# Patient Record
Sex: MALE | Race: WHITE | Employment: OTHER | URBAN - METROPOLITAN AREA
[De-identification: names, ages, dates, MRNs, and addresses within clinical notes are randomized per-mention and may not be internally consistent; named-entity substitution may affect disease eponyms.]

---

## 2017-10-26 ENCOUNTER — DOCTOR'S OFFICE (OUTPATIENT)
Dept: URBAN - METROPOLITAN AREA CLINIC 137 | Facility: CLINIC | Age: 72
Setting detail: OPHTHALMOLOGY
End: 2017-10-26
Payer: COMMERCIAL

## 2017-10-26 ENCOUNTER — RX ONLY (RX ONLY)
Age: 72
End: 2017-10-26

## 2017-10-26 DIAGNOSIS — H52.4: ICD-10-CM

## 2017-10-26 PROCEDURE — 92014 COMPRE OPH EXAM EST PT 1/>: CPT | Performed by: OPHTHALMOLOGY

## 2017-10-26 ASSESSMENT — TEAR BREAK UP TIME (TBUT)
OS_TBUT: 1+
OD_TBUT: 1+

## 2017-10-26 ASSESSMENT — AXIALLENGTH_DERIVED
OS_AL: 22.9017
OD_AL: 22.5429
OS_AL: 22.9017
OD_AL: 22.6322

## 2017-10-26 ASSESSMENT — REFRACTION_CURRENTRX
OS_OVR_VA: 20/
OS_ADD: +2.50
OS_OVR_VA: 20/
OD_OVR_VA: 20/
OD_AXIS: 008
OS_OVR_VA: 20/
OS_VPRISM_DIRECTION: BF
OS_SPHERE: PLANO
OD_OVR_VA: 20/
OS_CYLINDER: +1.50
OD_VPRISM_DIRECTION: BF
OD_ADD: +2.50
OS_AXIS: 007
OD_CYLINDER: +0.75
OD_OVR_VA: 20/
OD_SPHERE: +0.75

## 2017-10-26 ASSESSMENT — REFRACTION_MANIFEST
OU_VA: 20/
OS_VA1: 20/20
OD_VA2: 20/
OS_AXIS: 180
OS_CYLINDER: +1.50
OD_AXIS: 005
OD_VA2: 20/
OS_VA3: 20/
OS_VA1: 20/
OS_VA3: 20/
OD_VA1: 20/
OD_VA3: 20/
OS_ADD: +2.50
OD_CYLINDER: +0.75
OD_SPHERE: +1.25
OS_VA2: 20/
OD_VA3: 20/
OU_VA: 20/
OD_VA1: 20/20
OS_VA2: 20/
OD_ADD: +2.50
OS_SPHERE: +0.25

## 2017-10-26 ASSESSMENT — REFRACTION_OUTSIDERX
OS_AXIS: 175
OS_ADD: +2.50
OD_ADD: +2.50
OD_VA3: 20/
OS_VA1: 20/20
OS_SPHERE: +0.50
OD_VA2: 20/J-1
OD_VA1: 20/20
OS_VA2: 20/J-1
OS_CYLINDER: +0.75
OU_VA: 20/
OD_AXIS: 10
OD_CYLINDER: +0.50
OD_SPHERE: +1.50
OS_VA3: 20/

## 2017-10-26 ASSESSMENT — CONFRONTATIONAL VISUAL FIELD TEST (CVF)
OS_FINDINGS: FULL
OD_FINDINGS: FULL

## 2017-10-26 ASSESSMENT — KERATOMETRY
OD_K1POWER_DIOPTERS: 44.25
OS_K2POWER_DIOPTERS: 44.75
OD_K2POWER_DIOPTERS: 44.75
OD_AXISANGLE_DEGREES: 174
OS_K1POWER_DIOPTERS: 44.00
OS_AXISANGLE_DEGREES: 173

## 2017-10-26 ASSESSMENT — REFRACTION_AUTOREFRACTION
OS_CYLINDER: +1.50
OD_CYLINDER: +0.75
OD_AXIS: 15
OS_SPHERE: +0.25
OS_AXIS: 172
OD_SPHERE: +1.50

## 2017-10-26 ASSESSMENT — VISUAL ACUITY
OD_BCVA: 20/20-1
OS_BCVA: 20/25-2

## 2017-10-26 ASSESSMENT — SPHEQUIV_DERIVED
OD_SPHEQUIV: 1.875
OD_SPHEQUIV: 1.625
OS_SPHEQUIV: 1
OS_SPHEQUIV: 1

## 2017-11-29 ENCOUNTER — OPTICAL OFFICE (OUTPATIENT)
Dept: URBAN - METROPOLITAN AREA CLINIC 146 | Facility: CLINIC | Age: 72
Setting detail: OPHTHALMOLOGY
End: 2017-11-29
Payer: COMMERCIAL

## 2017-11-29 DIAGNOSIS — H52.223: ICD-10-CM

## 2017-11-29 PROCEDURE — V2203 LENS SPHCYL BIFOCAL 4.00D/.1: HCPCS | Performed by: OPHTHALMOLOGY

## 2017-11-29 PROCEDURE — V2020 VISION SVCS FRAMES PURCHASES: HCPCS | Performed by: OPHTHALMOLOGY

## 2018-07-06 ENCOUNTER — OFFICE VISIT (OUTPATIENT)
Dept: FAMILY MEDICINE CLINIC | Facility: CLINIC | Age: 73
End: 2018-07-06
Payer: COMMERCIAL

## 2018-07-06 VITALS
SYSTOLIC BLOOD PRESSURE: 160 MMHG | DIASTOLIC BLOOD PRESSURE: 100 MMHG | WEIGHT: 203.9 LBS | HEART RATE: 61 BPM | HEIGHT: 71 IN | BODY MASS INDEX: 28.55 KG/M2 | TEMPERATURE: 98.6 F | OXYGEN SATURATION: 97 %

## 2018-07-06 DIAGNOSIS — M54.5 LOW BACK PAIN, UNSPECIFIED BACK PAIN LATERALITY, UNSPECIFIED CHRONICITY, WITH SCIATICA PRESENCE UNSPECIFIED: Primary | ICD-10-CM

## 2018-07-06 PROBLEM — M54.50 LOW BACK PAIN: Status: ACTIVE | Noted: 2018-07-06

## 2018-07-06 PROCEDURE — 3725F SCREEN DEPRESSION PERFORMED: CPT | Performed by: FAMILY MEDICINE

## 2018-07-06 PROCEDURE — 99213 OFFICE O/P EST LOW 20 MIN: CPT | Performed by: FAMILY MEDICINE

## 2018-07-06 RX ORDER — ESOMEPRAZOLE MAGNESIUM 40 MG/1
40 CAPSULE, DELAYED RELEASE ORAL
COMMUNITY
End: 2018-12-18 | Stop reason: ALTCHOICE

## 2018-07-06 RX ORDER — IBUPROFEN 600 MG/1
600 TABLET ORAL EVERY 8 HOURS SCHEDULED
Qty: 21 TABLET | Refills: 0 | Status: SHIPPED | OUTPATIENT
Start: 2018-07-06 | End: 2019-01-22 | Stop reason: HOSPADM

## 2018-07-06 NOTE — PATIENT INSTRUCTIONS
Continue the heat  Gentle stretching of the spine in all planes 3 times a day for about 45 min  Take the anti-inflammatories 3 times a day for study 7 day treatment plan

## 2018-07-06 NOTE — PROGRESS NOTES
Assessment/Plan:    No problem-specific Assessment & Plan notes found for this encounter  Diagnoses and all orders for this visit:    Low back pain, unspecified back pain laterality, unspecified chronicity, with sciatica presence unspecified  Comments:  2 -3 wks ; no fall / trauma ; Orders:  -     ibuprofen (MOTRIN) 600 mg tablet; Take 1 tablet (600 mg total) by mouth every 8 (eight) hours    Other orders  -     esomeprazole (NexIUM) 40 MG capsule; Take 40 mg by mouth every morning before breakfast          Subjective:      Patient ID: Donta Solis is a 68 y o  male  Patient describes 2-3 weeks of low back pain nonradicular no history of fall or trauma and no prior history of back issues  He has tried of sporadic ibuprofen to some mild relief        Back Pain     Hip Pain          The following portions of the patient's history were reviewed and updated as appropriate: allergies, current medications, past family history, past medical history, past social history, past surgical history and problem list     Review of Systems      Objective:  Vitals:    07/06/18 1604   BP: 160/100   BP Location: Left arm   Patient Position: Sitting   Cuff Size: Adult   Pulse: 61   Temp: 98 6 °F (37 °C)   SpO2: 97%   Weight: 92 5 kg (203 lb 14 4 oz)   Height: 5' 11" (1 803 m)      Physical Exam   Musculoskeletal: He exhibits tenderness    << ROM spine due to pain

## 2018-08-15 ENCOUNTER — TELEPHONE (OUTPATIENT)
Dept: FAMILY MEDICINE CLINIC | Facility: CLINIC | Age: 73
End: 2018-08-15

## 2018-08-15 NOTE — TELEPHONE ENCOUNTER
Pt is looking for x ray results and will like to know if we received them he also wanted to know if he would need an mri he is scheduled for tomorrow morning thank you

## 2018-08-15 NOTE — TELEPHONE ENCOUNTER
Patient notified that we did not received the result   Patient will be calling express care for the result

## 2018-08-16 ENCOUNTER — OFFICE VISIT (OUTPATIENT)
Dept: FAMILY MEDICINE CLINIC | Facility: CLINIC | Age: 73
End: 2018-08-16
Payer: COMMERCIAL

## 2018-08-16 VITALS
HEART RATE: 68 BPM | TEMPERATURE: 96.1 F | RESPIRATION RATE: 18 BRPM | WEIGHT: 205.6 LBS | HEIGHT: 71 IN | BODY MASS INDEX: 28.78 KG/M2 | OXYGEN SATURATION: 97 % | DIASTOLIC BLOOD PRESSURE: 98 MMHG | SYSTOLIC BLOOD PRESSURE: 178 MMHG

## 2018-08-16 DIAGNOSIS — M25.561 ACUTE PAIN OF RIGHT KNEE: Primary | ICD-10-CM

## 2018-08-16 PROCEDURE — 1036F TOBACCO NON-USER: CPT | Performed by: FAMILY MEDICINE

## 2018-08-16 PROCEDURE — 99213 OFFICE O/P EST LOW 20 MIN: CPT | Performed by: FAMILY MEDICINE

## 2018-08-16 PROCEDURE — 1160F RVW MEDS BY RX/DR IN RCRD: CPT | Performed by: FAMILY MEDICINE

## 2018-08-16 PROCEDURE — 3008F BODY MASS INDEX DOCD: CPT | Performed by: FAMILY MEDICINE

## 2018-08-16 PROCEDURE — 20610 DRAIN/INJ JOINT/BURSA W/O US: CPT | Performed by: FAMILY MEDICINE

## 2018-08-16 RX ORDER — LIDOCAINE HYDROCHLORIDE 10 MG/ML
2 INJECTION, SOLUTION INFILTRATION; PERINEURAL
Status: COMPLETED | OUTPATIENT
Start: 2018-08-16 | End: 2018-08-16

## 2018-08-16 RX ORDER — TRIAMCINOLONE ACETONIDE 40 MG/ML
40 INJECTION, SUSPENSION INTRA-ARTICULAR; INTRAMUSCULAR
Status: COMPLETED | OUTPATIENT
Start: 2018-08-16 | End: 2018-08-16

## 2018-08-16 RX ORDER — TRIAMCINOLONE ACETONIDE 40 MG/ML
20 INJECTION, SUSPENSION INTRA-ARTICULAR; INTRAMUSCULAR
Status: COMPLETED | OUTPATIENT
Start: 2018-08-16 | End: 2018-08-16

## 2018-08-16 RX ADMIN — TRIAMCINOLONE ACETONIDE 40 MG: 40 INJECTION, SUSPENSION INTRA-ARTICULAR; INTRAMUSCULAR at 07:57

## 2018-08-16 RX ADMIN — LIDOCAINE HYDROCHLORIDE 2 ML: 10 INJECTION, SOLUTION INFILTRATION; PERINEURAL at 07:57

## 2018-08-16 RX ADMIN — TRIAMCINOLONE ACETONIDE 20 MG: 40 INJECTION, SUSPENSION INTRA-ARTICULAR; INTRAMUSCULAR at 07:57

## 2018-08-16 NOTE — PATIENT INSTRUCTIONS
CALL ME IN 1 WEEK IF WERE NOT GETTING SOME IMPROVEMENT  IF THE KNEE DOES WELL FOR A TIME AND THEN RECURRED TURNS COME BACK AGAIN FOR A BOOSTER SHOT    IF THERE IS NO IMPROVEMENT IN ALL PLEASE CALL

## 2018-08-16 NOTE — PROGRESS NOTES
Assessment/Plan:    No problem-specific Assessment & Plan notes found for this encounter  There are no diagnoses linked to this encounter  Subjective:      Patient ID: Edgar Edge is a 68 y o  male  JACE DESCRIBES PAIN IN THE RIGHT KNEE FOR APPROXIMATELY 4-5 WEEKS  POSSIBLY INITIATED BY A LONG RUN OR A OF FALL THAT HE HAD WHICH POSSIBLY REAGGRAVATED  HE HAS NO HISTORY OF PRIOR KNEE ISSUES  IBUPROFEN HAS NOT BEEN VERY HELPFUL  HE HAD X-RAYS DONE AT AN URGENT CARE WHICH WERE REPORTEDLY NORMAL  The following portions of the patient's history were reviewed and updated as appropriate: allergies, current medications, past family history, past medical history, past social history, past surgical history and problem list     Review of Systems   Musculoskeletal: Positive for arthralgias  Objective: There were no vitals filed for this visit  Physical Exam   Musculoskeletal: He exhibits edema  He exhibits no tenderness     NO CREPITUS          Large joint arthrocentesis  Date/Time: 8/16/2018 7:57 AM  Consent given by: patient  Site marked: site marked (PREPPED USING STERILE TECHNIQUE)  Supporting Documentation  Indications: pain   Procedure Details  Location: knee - R knee  Needle size: 25 G  Approach: anterolateral  Medications administered: 40 mg triamcinolone acetonide 40 mg/mL; 20 mg triamcinolone acetonide 40 mg/mL; 2 mL lidocaine 1 %

## 2018-09-06 ENCOUNTER — OFFICE VISIT (OUTPATIENT)
Dept: FAMILY MEDICINE CLINIC | Facility: CLINIC | Age: 73
End: 2018-09-06
Payer: COMMERCIAL

## 2018-09-06 VITALS
RESPIRATION RATE: 18 BRPM | HEART RATE: 58 BPM | HEIGHT: 71 IN | WEIGHT: 197.2 LBS | SYSTOLIC BLOOD PRESSURE: 170 MMHG | DIASTOLIC BLOOD PRESSURE: 100 MMHG | BODY MASS INDEX: 27.61 KG/M2

## 2018-09-06 DIAGNOSIS — L98.9 SKIN LESION: Primary | ICD-10-CM

## 2018-09-06 PROBLEM — R03.0 ELEVATED BLOOD PRESSURE READING: Chronic | Status: ACTIVE | Noted: 2018-09-06

## 2018-09-06 PROCEDURE — 99213 OFFICE O/P EST LOW 20 MIN: CPT | Performed by: FAMILY MEDICINE

## 2018-09-06 PROCEDURE — 17110 DESTRUCTION B9 LES UP TO 14: CPT | Performed by: FAMILY MEDICINE

## 2018-09-06 NOTE — PATIENT INSTRUCTIONS
PROPER BLOOD PRESSURE MEASUREMENTS INCLUDE:     SIT IN A STRAIGHT BACK CHAIR WITH YOUR FEET ON THE FLOOR   SIT AT LEAST 5 MINUTES BEFORE CHECKING BP   USE A CUFF THAT IS ABOVE THE ELBOW AND AUTOMATIC   "OMRON" IS A GOOD BRAND : [facility]/ [facility] HAVE THEM    RECORD BP 3-4 TIMES PER WEEK AND BRING TO NEXT OFFICE VISIT    Target Blood Pressure :  145/90 or less     AVOID THESE HIGH SALT FOODS:    SNACKS THAT TASTE SALTY: PRETZELS/CHIPSPOPCORN  CANNED SOUPS AND VEGGIES  FROZEN FOODS/ MICROWAVEABLE FOODS/ CANNED FOODS    AVOID ADDING EXTRA SALT TO THE MEALS CURRENT [employer] TIPS ON EXERCISE:    IF YOU HAVE CONDITIONS THAT PREVENT AEROBIC EXERCISE:    WALK 30 MINUTES AT LEAST 5 DAYS PER WEEK; MAY BREAK IT UP INTO 10-  15 MINUTE SESSIONS   GET SOME RESISTANCE EXERCISES USING THE MAJOR MUSCLE GROUPS 2-3 TIMES PER WEEK     IF YOU ARE PHYSICALLY ABLE:    TRY TO GET 10,000 STEPS 3 TIMES PER WEEK  PLUS  GO "ONLINE" TO CHECK TARGET HEART RATE FOR YOUR AGE AND DO AEROBIC EXERCISES (JOG/STATIONARY BIKE/ELLIPTICAL) FOR 20-30 MINUTES 2-3 TIMES PER WEEK  Consider taking ibuprofen 600 mg 1-2 hours before physical activity that might cause musculoskeletal pain  For the knee gradually increase your duration and level of intensity for exercise over the next several weeks

## 2018-09-06 NOTE — PROGRESS NOTES
Assessment/Plan:    Low back pain  Gets worse with yard work        Diagnoses and all orders for this visit:    Skin lesion  Comments:  N2 6 mos ago and remians           Subjective:      Patient ID: Berkeley Saint is a 68 y o  male      HPI    The following portions of the patient's history were reviewed and updated as appropriate: allergies, current medications, past family history, past medical history, past social history, past surgical history and problem list     Review of Systems      Objective:  Vitals:    09/06/18 1141   BP: 170/100   BP Location: Left arm   Patient Position: Sitting   Cuff Size: Large   Pulse: 58   Resp: 18   Weight: 89 4 kg (197 lb 3 2 oz)   Height: 5' 11" (1 803 m)      Physical Exam      Irritated area under l eye : frozen today     n2 today   abit deeper

## 2018-09-25 ENCOUNTER — TELEPHONE (OUTPATIENT)
Dept: FAMILY MEDICINE CLINIC | Facility: CLINIC | Age: 73
End: 2018-09-25

## 2018-09-25 NOTE — TELEPHONE ENCOUNTER
Pt called in states that he was mowing his lawn and hurt his back  Pt did some back exercises, applied ice to his spine, and did some knee to chests  Pt does feel better today  Pt wants to ask Cathy Miller does he have to worry about mowing his lawn for future reference? Please advise thank you

## 2018-09-27 ENCOUNTER — TELEPHONE (OUTPATIENT)
Dept: FAMILY MEDICINE CLINIC | Facility: CLINIC | Age: 73
End: 2018-09-27

## 2018-09-27 NOTE — TELEPHONE ENCOUNTER
Pt is calling because he will like to get an mri of the hip and back pt is not any better with pain after doing exercises   Please put in an order thank you dr Dilan Chua patient

## 2018-09-28 ENCOUNTER — TELEPHONE (OUTPATIENT)
Dept: FAMILY MEDICINE CLINIC | Facility: CLINIC | Age: 73
End: 2018-09-28

## 2018-09-28 NOTE — TELEPHONE ENCOUNTER
Patient calling back in regards to wanting to know if a mri can be ordered for him for his hip + back after doing exercises isn't any better he also called on 9/27/18 I informed him dr Ely Kuo isn't in the office that he will be in on Monday so patient would like a call to let him know if he can get a mri done  Pt # [phone]    KT

## 2018-10-02 DIAGNOSIS — M25.559 ARTHRALGIA OF HIP, UNSPECIFIED LATERALITY: ICD-10-CM

## 2018-10-02 DIAGNOSIS — M54.5 LOW BACK PAIN, UNSPECIFIED BACK PAIN LATERALITY, UNSPECIFIED CHRONICITY, WITH SCIATICA PRESENCE UNSPECIFIED: Primary | ICD-10-CM

## 2018-10-03 ENCOUNTER — CLINICAL SUPPORT (OUTPATIENT)
Dept: FAMILY MEDICINE CLINIC | Facility: CLINIC | Age: 73
End: 2018-10-03
Payer: COMMERCIAL

## 2018-10-03 VITALS — TEMPERATURE: 96.7 F

## 2018-10-03 DIAGNOSIS — Z23 NEED FOR IMMUNIZATION AGAINST INFLUENZA: Primary | ICD-10-CM

## 2018-10-03 PROCEDURE — 4040F PNEUMOC VAC/ADMIN/RCVD: CPT

## 2018-10-03 PROCEDURE — 90662 IIV NO PRSV INCREASED AG IM: CPT

## 2018-10-03 PROCEDURE — G0008 ADMIN INFLUENZA VIRUS VAC: HCPCS

## 2018-10-29 ENCOUNTER — TELEPHONE (OUTPATIENT)
Dept: FAMILY MEDICINE CLINIC | Facility: CLINIC | Age: 73
End: 2018-10-29

## 2018-10-29 NOTE — TELEPHONE ENCOUNTER
Pt called stating he has had 2 rounds of nitro on his face and Dr Marjorie Johnson told pt to call if not better  He states it is not clearing up and would like to know who he would recommend for Derm

## 2018-11-06 ENCOUNTER — TELEPHONE (OUTPATIENT)
Dept: FAMILY MEDICINE CLINIC | Facility: CLINIC | Age: 73
End: 2018-11-06

## 2018-11-06 NOTE — TELEPHONE ENCOUNTER
Patient called he was seen on 9/6/18 for skin lesion on face and he said that he has to wait 6 mo to be seen with dermatology office in the mean time he said he washes his face every morning and the lesion opens up he would like to know what he can put on it in the mean time until he can be seen by dermatology  pt # [phone]  TY

## 2018-11-28 ENCOUNTER — DOCTOR'S OFFICE (OUTPATIENT)
Dept: URBAN - METROPOLITAN AREA CLINIC 137 | Facility: CLINIC | Age: 73
Setting detail: OPHTHALMOLOGY
End: 2018-11-28
Payer: COMMERCIAL

## 2018-11-28 ENCOUNTER — OPTICAL OFFICE (OUTPATIENT)
Dept: URBAN - METROPOLITAN AREA CLINIC 146 | Facility: CLINIC | Age: 73
Setting detail: OPHTHALMOLOGY
End: 2018-11-28
Payer: COMMERCIAL

## 2018-11-28 DIAGNOSIS — H52.4: ICD-10-CM

## 2018-11-28 DIAGNOSIS — H52.223: ICD-10-CM

## 2018-11-28 PROCEDURE — 92015 DETERMINE REFRACTIVE STATE: CPT | Performed by: OPTOMETRIST

## 2018-11-28 PROCEDURE — V2203 LENS SPHCYL BIFOCAL 4.00D/.1: HCPCS | Performed by: OPTOMETRIST

## 2018-11-28 PROCEDURE — V2755 UV LENS/ES: HCPCS | Performed by: OPTOMETRIST

## 2018-11-28 PROCEDURE — V2020 VISION SVCS FRAMES PURCHASES: HCPCS | Performed by: OPTOMETRIST

## 2018-11-28 ASSESSMENT — KERATOMETRY
OS_K1POWER_DIOPTERS: 44.00
OS_K2POWER_DIOPTERS: 44.75
OD_K2POWER_DIOPTERS: 44.75
OS_AXISANGLE_DEGREES: 173
OD_AXISANGLE_DEGREES: 174
OD_K1POWER_DIOPTERS: 44.25

## 2018-11-28 ASSESSMENT — REFRACTION_CURRENTRX
OD_OVR_VA: 20/
OD_VPRISM_DIRECTION: BF
OD_OVR_VA: 20/
OS_AXIS: 007
OS_CYLINDER: +1.50
OS_OVR_VA: 20/
OS_OVR_VA: 20/
OD_SPHERE: +0.75
OS_SPHERE: PLANO
OS_VPRISM_DIRECTION: BF
OD_CYLINDER: +0.75
OD_OVR_VA: 20/
OS_OVR_VA: 20/
OD_ADD: +2.50
OD_AXIS: 008
OS_ADD: +2.50

## 2018-11-28 ASSESSMENT — REFRACTION_MANIFEST
OD_VA2: 20/
OD_VA2: 20/
OS_VA2: 20/
OD_CYLINDER: -0.50
OS_VA2: 20/
OS_VA3: 20/
OU_VA: 20/20
OD_VA1: 20/
OU_VA: 20/
OS_VA3: 20/
OD_SPHERE: +2.00
OD_VA1: 20/20
OS_VA1: 20/20
OD_VA3: 20/
OD_VA3: 20/
OS_CYLINDER: -0.75
OS_VA1: 20/
OD_ADD: +2.50
OS_SPHERE: +1.25
OD_AXIS: 100
OS_ADD: +2.50
OS_AXIS: 085

## 2018-11-28 ASSESSMENT — SPHEQUIV_DERIVED
OD_SPHEQUIV: 2.125
OD_SPHEQUIV: 1.75
OS_SPHEQUIV: 0.875
OS_SPHEQUIV: 1.25

## 2018-11-28 ASSESSMENT — AXIALLENGTH_DERIVED
OS_AL: 22.9477
OS_AL: 22.8103
OD_AL: 22.5875
OD_AL: 22.4543

## 2018-11-28 ASSESSMENT — REFRACTION_AUTOREFRACTION
OS_CYLINDER: -1.50
OD_CYLINDER: -0.75
OD_AXIS: 114
OD_SPHERE: +2.50
OS_SPHERE: +2.00
OS_AXIS: 84

## 2018-11-28 ASSESSMENT — VISUAL ACUITY
OD_BCVA: 20/20-2
OS_BCVA: 20/30-2

## 2018-11-28 ASSESSMENT — CONFRONTATIONAL VISUAL FIELD TEST (CVF)
OS_FINDINGS: FULL
OD_FINDINGS: FULL

## 2018-12-18 ENCOUNTER — OFFICE VISIT (OUTPATIENT)
Dept: FAMILY MEDICINE CLINIC | Facility: CLINIC | Age: 73
End: 2018-12-18
Payer: COMMERCIAL

## 2018-12-18 VITALS — BODY MASS INDEX: 28.56 KG/M2 | WEIGHT: 204 LBS | TEMPERATURE: 97.6 F | HEART RATE: 60 BPM | HEIGHT: 71 IN

## 2018-12-18 DIAGNOSIS — D22.9 ATYPICAL MOLE: ICD-10-CM

## 2018-12-18 DIAGNOSIS — R03.0 ELEVATED BLOOD PRESSURE READING: Chronic | ICD-10-CM

## 2018-12-18 DIAGNOSIS — M54.5 LOW BACK PAIN, UNSPECIFIED BACK PAIN LATERALITY, UNSPECIFIED CHRONICITY, WITH SCIATICA PRESENCE UNSPECIFIED: Primary | ICD-10-CM

## 2018-12-18 PROCEDURE — 99214 OFFICE O/P EST MOD 30 MIN: CPT | Performed by: FAMILY MEDICINE

## 2018-12-18 PROCEDURE — 3008F BODY MASS INDEX DOCD: CPT | Performed by: FAMILY MEDICINE

## 2018-12-18 PROCEDURE — 1101F PT FALLS ASSESS-DOCD LE1/YR: CPT | Performed by: FAMILY MEDICINE

## 2018-12-18 PROCEDURE — 1160F RVW MEDS BY RX/DR IN RCRD: CPT | Performed by: FAMILY MEDICINE

## 2018-12-18 RX ORDER — OMEPRAZOLE 40 MG/1
40 CAPSULE, DELAYED RELEASE ORAL DAILY
COMMUNITY
Start: 2018-11-21 | End: 2019-05-21 | Stop reason: SDUPTHER

## 2018-12-18 NOTE — PROGRESS NOTES
Assessment/Plan:    No problem-specific Assessment & Plan notes found for this encounter  Diagnoses and all orders for this visit:    Low back pain, unspecified back pain laterality, unspecified chronicity, with sciatica presence unspecified    Elevated blood pressure reading  Comments:  higher lately due to inactivity due to back issues : home cks     Other orders  -     omeprazole (PriLOSEC) 40 MG capsule;           Subjective:      Patient ID: Aishwarya Molina is a 68 y o  male  PATIENT RETURNS FOR FOLLOW-UP OF CHRONIC MEDICAL CONDITIONS  NO HOSPITAL STAYS OR EMERGENCY VISITS RECENTLY  MEDS WERE REVIEWED AND NO SIDE EFFECTS  NO NEW ISSUES  UNLESS NOTED BELOW  NO NEW MEDICAL PROVIDER REPORTED  Patient has had several months of recurring low back pain particularly on the left in the lumbosacral and sacroiliac region  Currently he is working with formal physical therapy and he is using some physical measures at home which seem to be helping  No true radicular symptoms are reported  Strength seems to be okay          The following portions of the patient's history were reviewed and updated as appropriate: allergies, current medications, past family history, past medical history, past social history, past surgical history and problem list     Review of Systems      Objective:  Vitals:    12/18/18 1105   Pulse: 60   Temp: 97 6 °F (36 4 °C)   Weight: 92 5 kg (204 lb)   Height: 5' 11" (1 803 m)      Physical Exam

## 2018-12-18 NOTE — PATIENT INSTRUCTIONS
Start checking blood pressure at home several times per week  PROPER BLOOD PRESSURE MEASUREMENTS INCLUDE:     SIT IN A STRAIGHT BACK CHAIR WITH YOUR FEET ON THE FLOOR   SIT AT LEAST 5 MINUTES BEFORE CHECKING BP   USE A CUFF THAT IS ABOVE THE ELBOW AND AUTOMATIC   "OMRON" IS A GOOD BRAND : [facility]/ [facility] HAVE THEM    RECORD BP 3-4 TIMES PER WEEK AND BRING TO NEXT OFFICE VISIT     target blood pressure for you is less than 150/90 most times  AVOID THESE HIGH SALT FOODS:    SNACKS THAT TASTE SALTY: PRETZELS/CHIPSPOPCORN  CANNED SOUPS AND VEGGIES  FROZEN FOODS/ MICROWAVEABLE FOODS/ CANNED FOODS    AVOID ADDING EXTRA SALT TO THE MEALS THESE THINGS HELP YOU LOSE WEIGHT:    REDUCE PORTIONS  DRINK WATER CONSTANTLY THROUGHOUT YOUR MEALS  ELIMINATE SWEET DRINKS  WEIGH DAILY AND KEEP A "VISUAL" CHART OF PROGRESS  REDUCE CARBOHYDRATES: PASTA/BREADS/BAGELS/DONUTS/ RICE ETC  DO SOME WALKING OR EXERCISE EVERY DAY FOR 20-30 MINUTES

## 2018-12-26 ENCOUNTER — TELEPHONE (OUTPATIENT)
Dept: FAMILY MEDICINE CLINIC | Facility: CLINIC | Age: 73
End: 2018-12-26

## 2018-12-26 ENCOUNTER — TREATMENT (OUTPATIENT)
Dept: FAMILY MEDICINE CLINIC | Facility: CLINIC | Age: 73
End: 2018-12-26

## 2018-12-26 DIAGNOSIS — G89.29 CHRONIC BILATERAL LOW BACK PAIN WITHOUT SCIATICA: Primary | ICD-10-CM

## 2018-12-26 DIAGNOSIS — M54.50 CHRONIC BILATERAL LOW BACK PAIN WITHOUT SCIATICA: Primary | ICD-10-CM

## 2018-12-26 NOTE — TELEPHONE ENCOUNTER
Patient called he said he was seen on 12/18/18 with dr Brittani Sam for back pain and that a mri is supposed to be set up for him he needs a script for the mri of his back and hip patient # [phone] and then will need to see if it needs a prior auth done also    Ty

## 2018-12-27 ENCOUNTER — TELEPHONE (OUTPATIENT)
Dept: FAMILY MEDICINE CLINIC | Facility: CLINIC | Age: 73
End: 2018-12-27

## 2018-12-27 NOTE — TELEPHONE ENCOUNTER
Called and spoke to pt to see when and where he wanted to schedule his MRI Lumbar spine  Pt would like to go to [facility]  Called to schedule with them however there are questions he will need to answer so they will call him  Pt is aware and was informed to call back with a date to work on prior Nneka Alanis

## 2018-12-28 ENCOUNTER — TELEPHONE (OUTPATIENT)
Dept: FAMILY MEDICINE CLINIC | Facility: CLINIC | Age: 73
End: 2018-12-28

## 2018-12-28 NOTE — TELEPHONE ENCOUNTER
Called pt insurance to initiate auth for MRI lumbar spine  It is in pending status  Case #[id number]  Will fax additional clinicals to [phone]   Pt scheduled 01/04/19 at [facility]

## 2019-01-09 ENCOUNTER — TELEPHONE (OUTPATIENT)
Dept: FAMILY MEDICINE CLINIC | Facility: CLINIC | Age: 74
End: 2019-01-09

## 2019-01-09 NOTE — TELEPHONE ENCOUNTER
Call patient    Formal physical therapy should permit him to get back to his usual activity after his treatment program     Concerning the EKG and lab work I do not have an on the file please secure it and send it to me please

## 2019-01-09 NOTE — TELEPHONE ENCOUNTER
Patient called he said he received a call from the office no message was left for him not sure if it is in regards to his Mri results it was done at [facility] and he also said he had blood work done and a EKG done for his pre op clearance that was done at [facility] he said that office should contact them to get results sent to the office for his clearance  Pt # [phone]   TY

## 2019-01-10 ENCOUNTER — TELEPHONE (OUTPATIENT)
Dept: FAMILY MEDICINE CLINIC | Facility: CLINIC | Age: 74
End: 2019-01-10

## 2019-01-10 DIAGNOSIS — M54.9 BACK PAIN, UNSPECIFIED BACK LOCATION, UNSPECIFIED BACK PAIN LATERALITY, UNSPECIFIED CHRONICITY: Primary | ICD-10-CM

## 2019-01-11 ENCOUNTER — TELEPHONE (OUTPATIENT)
Dept: FAMILY MEDICINE CLINIC | Facility: CLINIC | Age: 74
End: 2019-01-11

## 2019-01-11 NOTE — TELEPHONE ENCOUNTER
Patient called he said he is scheduled for sx on 1/22/19 and that he had EKG done and blood work at [facility] he wants to make sure that the results are here for when he see dr Princess Saenz on 1/16/19   TY

## 2019-01-16 ENCOUNTER — CONSULT (OUTPATIENT)
Dept: FAMILY MEDICINE CLINIC | Facility: CLINIC | Age: 74
End: 2019-01-16
Payer: COMMERCIAL

## 2019-01-16 VITALS
DIASTOLIC BLOOD PRESSURE: 100 MMHG | WEIGHT: 206.2 LBS | HEIGHT: 71 IN | HEART RATE: 51 BPM | OXYGEN SATURATION: 97 % | SYSTOLIC BLOOD PRESSURE: 150 MMHG | BODY MASS INDEX: 28.87 KG/M2 | TEMPERATURE: 96.9 F

## 2019-01-16 DIAGNOSIS — G89.29 CHRONIC MIDLINE LOW BACK PAIN WITHOUT SCIATICA: ICD-10-CM

## 2019-01-16 DIAGNOSIS — D49.9 NEOPLASM: Primary | ICD-10-CM

## 2019-01-16 DIAGNOSIS — R03.0 ELEVATED BLOOD PRESSURE READING: Chronic | ICD-10-CM

## 2019-01-16 DIAGNOSIS — M54.50 CHRONIC MIDLINE LOW BACK PAIN WITHOUT SCIATICA: ICD-10-CM

## 2019-01-16 PROCEDURE — 99214 OFFICE O/P EST MOD 30 MIN: CPT | Performed by: FAMILY MEDICINE

## 2019-01-16 NOTE — PROGRESS NOTES
[facility] PRE-OPERATIVE EVALUATION  [facility] - [facility]    NAME: Zakia Dominguez  AGE: 68 y o  SEX: male  : 1945     DATE: 2019    Family Practice Pre-Operative Evaluation      Chief Complaint: Pre-operative Evaluation     Surgery: Excision facial lesion  Anticipated Date of Surgery: 19     History of Present Illness:     Patient has no prior history of bleeding issues or blood clots  Chronic conditions, medications and allergies were reviewed  There is no currently active infectious process  Assessment of Cardiac Risk:  · No unstable or severe angina or MI in the last 6 weeks or history of stent placement in the last year   · No decompensated heart failure (e g  New onset heart failure, NYHA functional class IV heart failure, or worsening existing heart failure) in past 3 mos  · No severe heart valve disease including aortic stenosis or symptomatic mitral stenosis     Exercise Capacity:  · Able to walk 4 blocks without symptoms  · Able to walk 2 flights without symptoms    NO Prior Anesthesia Reactions       No prolonged steroid use in past 6 mos  P A T  If done reviewed  Review of Systems:     Review of Systems   Constitutional: Negative for activity change and appetite change  HENT: Negative for trouble swallowing  Eyes: Negative for visual disturbance  Respiratory: Negative for cough and shortness of breath  Cardiovascular: Negative for chest pain, palpitations and leg swelling  Gastrointestinal: Negative for abdominal pain and blood in stool  Endocrine: Negative for polyuria  Genitourinary: Negative for difficulty urinating and hematuria  Skin: Negative for rash  Neurological: Negative for dizziness  Psychiatric/Behavioral: Negative for behavioral problems         Current Problem List:     Patient Active Problem List   Diagnosis    Low back pain    Elevated blood pressure reading    Neoplasm       Allergies:     No Known Allergies    Current Medications:       Current Outpatient Prescriptions:     ibuprofen (MOTRIN) 600 mg tablet, Take 1 tablet (600 mg total) by mouth every 8 (eight) hours, Disp: 21 tablet, Rfl: 0    omeprazole (PriLOSEC) 40 MG capsule, , Disp: , Rfl:     Past Medical History:       Past Medical History:   Diagnosis Date    GERD (gastroesophageal reflux disease)     Seasonal allergies         Past Surgical History:   Procedure Laterality Date    KNEE ARTHROCENTESIS Right     LATERAL JOINT LINE        Family History   Problem Relation Age of Onset    Heart disease Father         CARDIOVASCULAR        Social History     Social History    Marital status: Single     Spouse name: N/A    Number of children: N/A    Years of education: N/A     Occupational History    Not on file  Social History Main Topics    Smoking status: Never Smoker    Smokeless tobacco: Never Used    Alcohol use Yes      Comment: rarely    Drug use: No    Sexual activity: Not on file     Other Topics Concern    Not on file     Social History Narrative    No narrative on file        Physical Exam:     /100 (BP Location: Left arm, Patient Position: Sitting, Cuff Size: Large)   Pulse (!) 51   Temp (!) 96 9 °F (36 1 °C) (Temporal)   Ht 5' 11" (1 803 m)   Wt 93 5 kg (206 lb 3 2 oz)   SpO2 97%   BMI 28 76 kg/m²     Physical Exam   Constitutional: He appears well-developed and well-nourished  HENT:   Head: Normocephalic and atraumatic  Eyes: Conjunctivae are normal    Neck: Neck supple  No thyromegaly present  Cardiovascular: Normal rate, regular rhythm, normal heart sounds and intact distal pulses  No murmur heard  Pulmonary/Chest: Effort normal and breath sounds normal  No respiratory distress  Musculoskeletal: He exhibits no edema  Lymphadenopathy:     He has no cervical adenopathy  Skin: Skin is warm and dry  Psychiatric: He has a normal mood and affect   His behavior is normal        Operative site has been examined and clear of skin infection and inflammation  Assessment & Recommendations:     Patient is cleared for surgery as detailed above  Surgical Procedure risk category: low  Patient specific operative risk categegory: low     Patient's blood pressure tends to run slightly high in a medical facility    His home numbers are acceptable at this time and to goal     I I will review the EKG prior to surgery

## 2019-01-18 ENCOUNTER — TELEPHONE (OUTPATIENT)
Dept: OTHER | Facility: OTHER | Age: 74
End: 2019-01-18

## 2019-01-18 NOTE — TELEPHONE ENCOUNTER
Savannah Michaud is calling from [address] office to get a Copy of  Patients medical clearance for his surgery Tuesday January 22        Fax# [phone]

## 2019-01-21 ENCOUNTER — ANESTHESIA EVENT (OUTPATIENT)
Dept: PERIOP | Facility: HOSPITAL | Age: 74
End: 2019-01-21
Payer: COMMERCIAL

## 2019-01-21 RX ORDER — CEFAZOLIN SODIUM 1 G/50ML
1000 SOLUTION INTRAVENOUS ONCE
Status: DISCONTINUED | OUTPATIENT
Start: 2019-01-22 | End: 2019-01-21

## 2019-01-21 RX ORDER — CEFAZOLIN SODIUM 1 G/50ML
1000 SOLUTION INTRAVENOUS ONCE
Status: CANCELLED | OUTPATIENT
Start: 2019-01-22

## 2019-01-21 NOTE — ANESTHESIA PREPROCEDURE EVALUATION
Review of Systems/Medical History  Patient summary reviewed  Chart reviewed      Cardiovascular  Exercise tolerance (METS): <4,  Hypertension controlled,    Pulmonary  Not a smoker , No asthma , No sleep apnea ,        GI/Hepatic    GERD well controlled,        Negative  ROS        Endo/Other  Negative endo/other ROS      GYN       Hematology  Negative hematology ROS      Musculoskeletal  Negative musculoskeletal ROS        Neurology  Negative neurology ROS      Psychology   Negative psychology ROS              Physical Exam    Airway    Mallampati score: II    Neck ROM: full     Dental       Cardiovascular  Cardiovascular exam normal    Pulmonary      Other Findings  Fixed upper and lower teeth and in good repair      Anesthesia Plan  ASA Score- 2     Anesthesia Type- IV sedation with anesthesia with ASA Monitors  Additional Monitors:   Airway Plan:         Plan Factors-Patient not instructed to abstain from smoking on day of procedure  Patient did not smoke on day of surgery  Induction- intravenous  Postoperative Plan- Plan for postoperative opioid use  Informed Consent- Anesthetic plan and risks discussed with patient  I personally reviewed this patient with the CRNA  Discussed and agreed on the Anesthesia Plan with the CRNA  Darshan Cummings

## 2019-01-21 NOTE — PRE-PROCEDURE INSTRUCTIONS
Pre-Surgery Instructions:   Medication Instructions    omeprazole (PriLOSEC) 40 MG capsule Instructed patient per Anesthesia Guidelines

## 2019-01-21 NOTE — H&P
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      Plan:  Excision basal cell carcinoma left lower eyelid frozen section and flap closure    History of Present Illness   This is a 55-year-old female with a slowly enlarging lesion measuring approximately 2-1/2 by 1 cm of the left lower eyelid this is be to margins of petechiae ulcerated at the center has classical appearance of a basal cell carcinoma    Review of Systems   All other systems reviewed and are negative        Historical Information   Past Medical History:   Diagnosis Date    Chronic pain disorder     back    GERD (gastroesophageal reflux disease)     Seasonal allergies      Past Surgical History:   Procedure Laterality Date    COLONOSCOPY      KNEE ARTHROCENTESIS Right     LATERAL JOINT LINE     Social History   History   Alcohol Use    Yes     Comment: rarely     History   Drug Use No     History   Smoking Status    Never Smoker   Smokeless Tobacco    Never Used     Family History: non-contributory    Meds/Allergies   all medications and allergies reviewed  No Known Allergies    Objective   First Vitals:        Current Vitals:        No intake or output data in the 24 hours ending 01/21/19 1754    Invasive Devices          No matching active lines, drains, or airways          Physical Exam examination of the head ears eyes nose and throat is within normal limits heart sounds S1-S2 heard no murmurs or gallops lungs clear abdomen soft extremities are within normal limits left lower  eyelid is as above    Lab Results:   Imaging:   EKG, Pathology, and Other Studies:     Code Status: No Order  Advance Directive and Living Will:      Power of :    POLST:      Counseling / Coordination of Care:   None

## 2019-01-22 ENCOUNTER — ANESTHESIA (OUTPATIENT)
Dept: PERIOP | Facility: HOSPITAL | Age: 74
End: 2019-01-22
Payer: COMMERCIAL

## 2019-01-22 ENCOUNTER — HOSPITAL ENCOUNTER (OUTPATIENT)
Facility: HOSPITAL | Age: 74
Setting detail: OUTPATIENT SURGERY
Discharge: HOME | End: 2019-01-22
Attending: PLASTIC SURGERY | Admitting: PLASTIC SURGERY
Payer: COMMERCIAL

## 2019-01-22 VITALS
BODY MASS INDEX: 27.36 KG/M2 | OXYGEN SATURATION: 98 % | SYSTOLIC BLOOD PRESSURE: 176 MMHG | DIASTOLIC BLOOD PRESSURE: 83 MMHG | TEMPERATURE: 96.7 F | WEIGHT: 202 LBS | HEART RATE: 44 BPM | HEIGHT: 72 IN | RESPIRATION RATE: 16 BRPM

## 2019-01-22 DIAGNOSIS — C44.1992: Primary | ICD-10-CM

## 2019-01-22 PROCEDURE — 88305 TISSUE EXAM BY PATHOLOGIST: CPT | Performed by: PATHOLOGY

## 2019-01-22 PROCEDURE — 88331 PATH CONSLTJ SURG 1 BLK 1SPC: CPT | Performed by: PATHOLOGY

## 2019-01-22 RX ORDER — FENTANYL CITRATE/PF 50 MCG/ML
12.5 SYRINGE (ML) INJECTION
Status: DISCONTINUED | OUTPATIENT
Start: 2019-01-22 | End: 2019-01-22 | Stop reason: HOSPADM

## 2019-01-22 RX ORDER — MIDAZOLAM HYDROCHLORIDE 1 MG/ML
INJECTION INTRAMUSCULAR; INTRAVENOUS AS NEEDED
Status: DISCONTINUED | OUTPATIENT
Start: 2019-01-22 | End: 2019-01-22 | Stop reason: SURG

## 2019-01-22 RX ORDER — CEFAZOLIN SODIUM 2 G/50ML
SOLUTION INTRAVENOUS AS NEEDED
Status: DISCONTINUED | OUTPATIENT
Start: 2019-01-22 | End: 2019-01-22 | Stop reason: SURG

## 2019-01-22 RX ORDER — LIDOCAINE HYDROCHLORIDE AND EPINEPHRINE 5; 5 MG/ML; UG/ML
INJECTION, SOLUTION INFILTRATION; PERINEURAL AS NEEDED
Status: DISCONTINUED | OUTPATIENT
Start: 2019-01-22 | End: 2019-01-22 | Stop reason: HOSPADM

## 2019-01-22 RX ORDER — MAGNESIUM HYDROXIDE 1200 MG/15ML
LIQUID ORAL AS NEEDED
Status: DISCONTINUED | OUTPATIENT
Start: 2019-01-22 | End: 2019-01-22 | Stop reason: HOSPADM

## 2019-01-22 RX ORDER — DIPHENHYDRAMINE HYDROCHLORIDE 50 MG/ML
12.5 INJECTION INTRAMUSCULAR; INTRAVENOUS ONCE
Status: DISCONTINUED | OUTPATIENT
Start: 2019-01-22 | End: 2019-01-22 | Stop reason: HOSPADM

## 2019-01-22 RX ORDER — LIDOCAINE HYDROCHLORIDE 10 MG/ML
INJECTION, SOLUTION INFILTRATION; PERINEURAL AS NEEDED
Status: DISCONTINUED | OUTPATIENT
Start: 2019-01-22 | End: 2019-01-22 | Stop reason: SURG

## 2019-01-22 RX ORDER — TRAMADOL HYDROCHLORIDE 50 MG/1
50 TABLET ORAL EVERY 6 HOURS PRN
Qty: 10 TABLET | Refills: 0 | Status: SHIPPED | OUTPATIENT
Start: 2019-01-22 | End: 2019-06-18 | Stop reason: ALTCHOICE

## 2019-01-22 RX ORDER — FENTANYL CITRATE/PF 50 MCG/ML
25 SYRINGE (ML) INJECTION
Status: DISCONTINUED | OUTPATIENT
Start: 2019-01-22 | End: 2019-01-22 | Stop reason: HOSPADM

## 2019-01-22 RX ORDER — PROPOFOL 10 MG/ML
INJECTION, EMULSION INTRAVENOUS AS NEEDED
Status: DISCONTINUED | OUTPATIENT
Start: 2019-01-22 | End: 2019-01-22 | Stop reason: SURG

## 2019-01-22 RX ORDER — DEXAMETHASONE SODIUM PHOSPHATE 4 MG/ML
4 INJECTION, SOLUTION INTRA-ARTICULAR; INTRALESIONAL; INTRAMUSCULAR; INTRAVENOUS; SOFT TISSUE ONCE AS NEEDED
Status: DISCONTINUED | OUTPATIENT
Start: 2019-01-22 | End: 2019-01-22 | Stop reason: HOSPADM

## 2019-01-22 RX ORDER — ONDANSETRON 2 MG/ML
INJECTION INTRAMUSCULAR; INTRAVENOUS AS NEEDED
Status: DISCONTINUED | OUTPATIENT
Start: 2019-01-22 | End: 2019-01-22 | Stop reason: SURG

## 2019-01-22 RX ORDER — FENTANYL CITRATE 50 UG/ML
INJECTION, SOLUTION INTRAMUSCULAR; INTRAVENOUS AS NEEDED
Status: DISCONTINUED | OUTPATIENT
Start: 2019-01-22 | End: 2019-01-22 | Stop reason: SURG

## 2019-01-22 RX ORDER — BALANCED SALT SOLUTION 6.4; .75; .48; .3; 3.9; 1.7 MG/ML; MG/ML; MG/ML; MG/ML; MG/ML; MG/ML
SOLUTION OPHTHALMIC AS NEEDED
Status: DISCONTINUED | OUTPATIENT
Start: 2019-01-22 | End: 2019-01-22 | Stop reason: HOSPADM

## 2019-01-22 RX ORDER — PROPOFOL 10 MG/ML
INJECTION, EMULSION INTRAVENOUS CONTINUOUS PRN
Status: DISCONTINUED | OUTPATIENT
Start: 2019-01-22 | End: 2019-01-22 | Stop reason: SURG

## 2019-01-22 RX ORDER — SODIUM CHLORIDE, SODIUM LACTATE, POTASSIUM CHLORIDE, CALCIUM CHLORIDE 600; 310; 30; 20 MG/100ML; MG/100ML; MG/100ML; MG/100ML
75 INJECTION, SOLUTION INTRAVENOUS CONTINUOUS
Status: DISCONTINUED | OUTPATIENT
Start: 2019-01-22 | End: 2019-01-22 | Stop reason: HOSPADM

## 2019-01-22 RX ORDER — DEXAMETHASONE SODIUM PHOSPHATE 4 MG/ML
INJECTION, SOLUTION INTRA-ARTICULAR; INTRALESIONAL; INTRAMUSCULAR; INTRAVENOUS; SOFT TISSUE AS NEEDED
Status: DISCONTINUED | OUTPATIENT
Start: 2019-01-22 | End: 2019-01-22 | Stop reason: SURG

## 2019-01-22 RX ORDER — NEOMYCIN SULFATE, POLYMYXIN B SULFATE, AND DEXAMETHASONE 3.5; 10000; 1 MG/G; [USP'U]/G; MG/G
OINTMENT OPHTHALMIC AS NEEDED
Status: DISCONTINUED | OUTPATIENT
Start: 2019-01-22 | End: 2019-01-22 | Stop reason: HOSPADM

## 2019-01-22 RX ADMIN — PROPOFOL 50 MG: 10 INJECTION, EMULSION INTRAVENOUS at 09:51

## 2019-01-22 RX ADMIN — LIDOCAINE HYDROCHLORIDE 50 MG: 10 INJECTION, SOLUTION INFILTRATION; PERINEURAL at 09:51

## 2019-01-22 RX ADMIN — FENTANYL CITRATE 50 MCG: 50 INJECTION INTRAMUSCULAR; INTRAVENOUS at 09:50

## 2019-01-22 RX ADMIN — SODIUM CHLORIDE, POTASSIUM CHLORIDE, SODIUM LACTATE AND CALCIUM CHLORIDE 75 ML/HR: 600; 310; 30; 20 INJECTION, SOLUTION INTRAVENOUS at 09:37

## 2019-01-22 RX ADMIN — MIDAZOLAM HYDROCHLORIDE 2 MG: 1 INJECTION, SOLUTION INTRAMUSCULAR; INTRAVENOUS at 09:50

## 2019-01-22 RX ADMIN — DEXAMETHASONE SODIUM PHOSPHATE 4 MG: 4 INJECTION, SOLUTION INTRA-ARTICULAR; INTRALESIONAL; INTRAMUSCULAR; INTRAVENOUS; SOFT TISSUE at 09:55

## 2019-01-22 RX ADMIN — ONDANSETRON HYDROCHLORIDE 4 MG: 2 INJECTION, SOLUTION INTRAMUSCULAR; INTRAVENOUS at 09:55

## 2019-01-22 RX ADMIN — SODIUM CHLORIDE, POTASSIUM CHLORIDE, SODIUM LACTATE AND CALCIUM CHLORIDE: 600; 310; 30; 20 INJECTION, SOLUTION INTRAVENOUS at 09:35

## 2019-01-22 RX ADMIN — PROPOFOL 75 MCG/KG/MIN: 10 INJECTION, EMULSION INTRAVENOUS at 09:51

## 2019-01-22 RX ADMIN — FENTANYL CITRATE 50 MCG: 50 INJECTION INTRAMUSCULAR; INTRAVENOUS at 09:59

## 2019-01-22 RX ADMIN — CEFAZOLIN SODIUM 2000 MG: 2 SOLUTION INTRAVENOUS at 09:50

## 2019-01-22 NOTE — PERIOPERATIVE NURSING NOTE
ivsa out  Tolerated diet  Incision well approximated  Wants to go home  Discharged via w/c after discharge instructions given and verbalized an understanding of same

## 2019-01-22 NOTE — PERIOPERATIVE NURSING NOTE
Returned from pacu sleepy  But arousable  ivs running  Denies pain   Incision moist with ointment and well approximated  Eating and drinking    ivs infusing

## 2019-01-22 NOTE — OP NOTE
OPERATIVE REPORT  PATIENT NAME: Avel Perkins    :  1945  MRN: [id number]  Pt Location: [facility] OR ROOM 03    SURGERY DATE: 2019    Surgeon(s) and Role:     * Judy Healy MD - Primary    Preop Diagnosis:  Other specified malignant neoplasm of skin of left lower eyelid, including canthus [C44 1992] lesion right cheek    Post-Op Diagnosis Codes:     * Other specified malignant neoplasm of skin of left lower eyelid, including canthus [C44 1992] actinic  keratosis right cheek    Procedure(s) (LRB):  EXCISION OF BASAL CELL CA LOWER EYELID W/FROZEN SECTION, RECONSTRUCTION (Left)  Excision of lesion right cheek frozen section primary closure  Specimen(s):  ID Type Source Tests Collected by Time Destination   1 : Left lower eyelid lesion r/o basel cell suture @12 Tissue Soft Tissue, Other TISSUE EXAM Judy Healy MD 2019 1003    2 : Right cheek lesion r/o basel cell suture @12 Tissue Soft Tissue, Other TISSUE EXAM Judy Healy MD 2019 1005        Estimated Blood Loss:   Minimal    Drains:       Anesthesia Type:   IV Sedation with Anesthesia    Operative Indications: Other specified malignant neoplasm of skin of left lower eyelid, including canthus [C44 1992]  Nonhealing lesion right cheek  Nonhealing lesion right cheek    Operative Findings:  Basal cell carcinoma left lower eyelid frozen section revealed margins free of tumor actinic keratosis right cheek    Complications:   None    Procedure and Technique:  Patient was draped and prepped in normal sterile fashion marked for excision of lesion of the left lower eyelid and right cheek lesion of the left lower lighted eyelid measured 3 x 1 0 5 cm the right cheek was 1 x 1 0 5 cm these were then excised marked at the 12 o'clock position and frozen section revealed the above findings    Left lower eyelid was reconstructed using a rotational flap back cut was made along the nasal labial border this was elevated and sutured in place with 6 0 nylon sutures the lesion of the right cheek was undermined and closed primarily with 5 0 nylon sutures patient tolerated the procedure well balanced salt solution was used to flush the eyes and Maxitrol ointment was placed on the left lower eyelid   I was present for the entire procedure    Patient Disposition:  PACU     SIGNATURE: David Agustin MD  DATE: January 22, 2019  TIME: 10:50 AM

## 2019-01-22 NOTE — ANESTHESIA POSTPROCEDURE EVALUATION
Post-Op Assessment Note      CV Status:  Stable    Mental Status:  Alert and awake    Hydration Status:  Stable    PONV Controlled:  None    Airway Patency:  Patent    Post Op Vitals Reviewed: Yes          Staff: CRNA           BP     Temp (!) 97 °F (36 1 °C) (01/22/19 1051)    Pulse (!) 53 (01/22/19 1051)   Resp 16 (01/22/19 1051)    SpO2 98 % (01/22/19 1051)

## 2019-01-22 NOTE — INTERVAL H&P NOTE
H&P reviewed  After examining the patient I find no changes in the patients condition since the H&P had been written      No change

## 2019-01-22 NOTE — ANESTHESIA POSTPROCEDURE EVALUATION
Post-Op Assessment Note      CV Status:  Stable    Mental Status:  Alert and awake    Hydration Status:  Euvolemic    PONV Controlled:  Controlled    Airway Patency:  Patent    Post Op Vitals Reviewed: Yes          Staff: AnesthesiologistTOMY           /67 (01/22/19 1052)    Temp (!) 97 °F (36 1 °C) (01/22/19 1051)    Pulse (!) 49 (01/22/19 1052)   Resp 16 (01/22/19 1051)    SpO2 98 % (01/22/19 1052)

## 2019-02-14 ENCOUNTER — TELEPHONE (OUTPATIENT)
Dept: FAMILY MEDICINE CLINIC | Facility: CLINIC | Age: 74
End: 2019-02-14

## 2019-02-14 NOTE — TELEPHONE ENCOUNTER
Patient is calling because he needs dr Juan Pride to review his EKG and blood works before his surgery if you can please call him back [phone] thanks

## 2019-02-14 NOTE — TELEPHONE ENCOUNTER
Call patient  Labs are all normal EKG is unchanged from prior visits and from my perspective is okay

## 2019-02-15 ENCOUNTER — TELEPHONE (OUTPATIENT)
Dept: FAMILY MEDICINE CLINIC | Facility: CLINIC | Age: 74
End: 2019-02-15

## 2019-02-15 NOTE — TELEPHONE ENCOUNTER
Patient called he would like a call back today about his blood pressure and his EKG before having surgery he said his questions were not answered  can be reached at [facility]

## 2019-05-21 DIAGNOSIS — K21.9 GASTROESOPHAGEAL REFLUX DISEASE WITHOUT ESOPHAGITIS: Primary | ICD-10-CM

## 2019-05-21 RX ORDER — OMEPRAZOLE 40 MG/1
40 CAPSULE, DELAYED RELEASE ORAL DAILY
Qty: 90 CAPSULE | Refills: 1 | Status: SHIPPED | OUTPATIENT
Start: 2019-05-21 | End: 2019-11-11 | Stop reason: SDUPTHER

## 2019-06-05 ENCOUNTER — TELEPHONE (OUTPATIENT)
Dept: FAMILY MEDICINE CLINIC | Facility: CLINIC | Age: 74
End: 2019-06-05

## 2019-06-06 DIAGNOSIS — M54.5 LOW BACK PAIN, UNSPECIFIED BACK PAIN LATERALITY, UNSPECIFIED CHRONICITY, WITH SCIATICA PRESENCE UNSPECIFIED: Primary | ICD-10-CM

## 2019-06-06 RX ORDER — IBUPROFEN 600 MG/1
600 TABLET ORAL EVERY 8 HOURS SCHEDULED
Qty: 270 TABLET | Refills: 3 | Status: SHIPPED | OUTPATIENT
Start: 2019-06-06 | End: 2019-06-10 | Stop reason: SDUPTHER

## 2019-06-10 ENCOUNTER — TELEPHONE (OUTPATIENT)
Dept: FAMILY MEDICINE CLINIC | Facility: CLINIC | Age: 74
End: 2019-06-10

## 2019-06-10 DIAGNOSIS — M54.5 LOW BACK PAIN, UNSPECIFIED BACK PAIN LATERALITY, UNSPECIFIED CHRONICITY, WITH SCIATICA PRESENCE UNSPECIFIED: ICD-10-CM

## 2019-06-10 RX ORDER — IBUPROFEN 600 MG/1
600 TABLET ORAL EVERY 8 HOURS SCHEDULED
Qty: 270 TABLET | Refills: 3 | Status: SHIPPED | OUTPATIENT
Start: 2019-06-10 | End: 2020-07-13

## 2019-06-18 ENCOUNTER — OFFICE VISIT (OUTPATIENT)
Dept: FAMILY MEDICINE CLINIC | Facility: CLINIC | Age: 74
End: 2019-06-18
Payer: COMMERCIAL

## 2019-06-18 VITALS
BODY MASS INDEX: 27.07 KG/M2 | DIASTOLIC BLOOD PRESSURE: 90 MMHG | TEMPERATURE: 97.5 F | OXYGEN SATURATION: 98 % | WEIGHT: 199.9 LBS | HEART RATE: 45 BPM | SYSTOLIC BLOOD PRESSURE: 160 MMHG | HEIGHT: 72 IN

## 2019-06-18 DIAGNOSIS — K21.9 GASTROESOPHAGEAL REFLUX DISEASE WITHOUT ESOPHAGITIS: Primary | ICD-10-CM

## 2019-06-18 DIAGNOSIS — E78.00 PURE HYPERCHOLESTEROLEMIA: ICD-10-CM

## 2019-06-18 DIAGNOSIS — Z00.00 MEDICARE ANNUAL WELLNESS VISIT, SUBSEQUENT: Chronic | ICD-10-CM

## 2019-06-18 DIAGNOSIS — M54.5 LOW BACK PAIN, UNSPECIFIED BACK PAIN LATERALITY, UNSPECIFIED CHRONICITY, WITH SCIATICA PRESENCE UNSPECIFIED: ICD-10-CM

## 2019-06-18 DIAGNOSIS — R03.0 ELEVATED BLOOD PRESSURE READING: Chronic | ICD-10-CM

## 2019-06-18 PROCEDURE — G0439 PPPS, SUBSEQ VISIT: HCPCS | Performed by: FAMILY MEDICINE

## 2019-06-18 PROCEDURE — 99214 OFFICE O/P EST MOD 30 MIN: CPT | Performed by: FAMILY MEDICINE

## 2019-09-03 ENCOUNTER — TELEPHONE (OUTPATIENT)
Dept: FAMILY MEDICINE CLINIC | Facility: CLINIC | Age: 74
End: 2019-09-03

## 2019-09-03 NOTE — TELEPHONE ENCOUNTER
Pt called stating he is on Omeprazole and he heard it makes the bones weaker  He is wondering if he needs to be on probiotics

## 2019-10-11 ENCOUNTER — OFFICE VISIT (OUTPATIENT)
Dept: FAMILY MEDICINE CLINIC | Facility: CLINIC | Age: 74
End: 2019-10-11
Payer: COMMERCIAL

## 2019-10-11 VITALS
HEIGHT: 72 IN | SYSTOLIC BLOOD PRESSURE: 160 MMHG | HEART RATE: 54 BPM | WEIGHT: 198.2 LBS | BODY MASS INDEX: 26.84 KG/M2 | TEMPERATURE: 98 F | DIASTOLIC BLOOD PRESSURE: 102 MMHG | OXYGEN SATURATION: 98 %

## 2019-10-11 DIAGNOSIS — M25.552 PAIN OF LEFT HIP JOINT: ICD-10-CM

## 2019-10-11 DIAGNOSIS — Z23 IMMUNIZATION DUE: Primary | ICD-10-CM

## 2019-10-11 PROCEDURE — G0008 ADMIN INFLUENZA VIRUS VAC: HCPCS | Performed by: FAMILY MEDICINE

## 2019-10-11 PROCEDURE — 20610 DRAIN/INJ JOINT/BURSA W/O US: CPT | Performed by: FAMILY MEDICINE

## 2019-10-11 PROCEDURE — 90662 IIV NO PRSV INCREASED AG IM: CPT | Performed by: FAMILY MEDICINE

## 2019-10-11 PROCEDURE — 99213 OFFICE O/P EST LOW 20 MIN: CPT | Performed by: FAMILY MEDICINE

## 2019-10-11 PROCEDURE — 3008F BODY MASS INDEX DOCD: CPT | Performed by: FAMILY MEDICINE

## 2019-10-11 RX ADMIN — LIDOCAINE HYDROCHLORIDE 2 ML: 10 INJECTION, SOLUTION INFILTRATION; PERINEURAL at 11:18

## 2019-10-11 NOTE — PROGRESS NOTES
Assessment/Plan:    No problem-specific Assessment & Plan notes found for this encounter  Diagnoses and all orders for this visit:    Immunization due  -     influenza vaccine, 2288-6240, high-dose, PF 0 5 mL (FLUZONE HIGH-DOSE)    Pain of left hip joint    Other orders  -     Large joint arthrocentesis          Subjective:      Patient ID: Gerard Wilburn is a 69 y o  male  L hip pain ; injected 1-2 yrs ago and helped ;        The following portions of the patient's history were reviewed and updated as appropriate: allergies, current medications, past family history, past medical history, past social history, past surgical history and problem list     Review of Systems   Musculoskeletal:        ++ L hip pain          Objective:  Vitals:    10/11/19 1101   BP: (!) 160/102   BP Location: Left arm   Patient Position: Sitting   Cuff Size: Adult   Pulse: (!) 54   Temp: 98 °F (36 7 °C)   TempSrc: Temporal   SpO2: 98%   Weight: 89 9 kg (198 lb 3 2 oz)   Height: 5' 11 75" (1 822 m)        Physical Exam   Musculoskeletal:   Tender over L greater trochanteric area          Large joint arthrocentesis: L greater trochanteric bursa  Date/Time: 10/11/2019 11:18 AM  Consent given by: patient  Site marked: site marked  Supporting Documentation  Indications: pain   Procedure Details  Location: hip - L greater trochanteric bursa  Preparation: Patient was prepped and draped in the usual sterile fashion  Needle size: 25 G  Ultrasound guidance: no  Approach: lateral  Medications administered: 2 mL lidocaine 1 %

## 2019-10-11 NOTE — PATIENT INSTRUCTIONS
PROPER BLOOD PRESSURE MEASUREMENTS INCLUDE:     SIT IN A STRAIGHT BACK CHAIR WITH YOUR FEET ON THE FLOOR   SIT AT LEAST 5 MINUTES BEFORE CHECKING BP   USE A CUFF THAT IS ABOVE THE ELBOW AND AUTOMATIC   "[employer]" IS A GOOD BRAND : [facility]/ [facility] HAVE THEM    RECORD BP 3-4 TIMES PER WEEK AND BRING TO NEXT OFFICE VISIT    Target /90 or less     Lower Back Exercises   WHAT YOU NEED TO KNOW:   Lower back exercises help heal and strengthen your back muscles to prevent another injury  Ask your healthcare provider if you need to see a physical therapist for more advanced exercises  DISCHARGE INSTRUCTIONS:   Return to the emergency department if:   · You have severe pain that prevents you from moving  Contact your healthcare provider if:   · Your pain becomes worse  · You have new pain  · You have questions or concerns about your condition or care  Do lower back exercises safely:   · Do the exercises on a mat or firm surface  (not on a bed) to support your spine and prevent low back pain  · Move slowly and smoothly  Avoid fast or jerky motions  · Breathe normally  Do not hold your breath  · Stop if you feel pain  It is normal to feel some discomfort at first  Regular exercise will help decrease your discomfort over time  Lower back exercises: Your healthcare provider may recommend that you do back exercises 10 to 30 minutes each day  He may also recommend that you do exercises 1 to 3 times each day  Ask your healthcare provider which exercises are best for you and how often to do them  · Ankle pumps:  Lie on your back  Move your foot up (with your toes pointing toward your head)  Then, move your foot down (with your toes pointing away from you)  Repeat this exercise 10 times on each side  · Heel slides:  Lie on your back  Slowly bend one leg and then straighten it  Next, bend the other leg and then straighten it  Repeat 10 times on each side             · Pelvic tilt:  Lie on your back with your knees bent and feet flat on the floor  Place your arms in a relaxed position beside your body  Tighten the muscles of your abdomen and flatten your back against the floor  Hold for 5 seconds  Repeat 5 times  · Back stretch:  Lie on your back with your hands behind your head  Bend your knees and turn the lower half of your body to one side  Hold this position for 10 seconds  Repeat 3 times on each side  · Straight leg raises:  Lie on your back with one leg straight  Bend the other knee  Tighten your abdomen and then slowly lift the straight leg up about 6 to 12 inches off the floor  Hold for 1 to 5 seconds  Lower your leg slowly  Repeat 10 times on each leg  · Knee-to-chest:  Lie on your back with your knees bent and feet flat on the floor  Pull one of your knees toward your chest and hold it there for 5 seconds  Return your leg to the starting position  Lift the other knee toward your chest and hold for 5 seconds  Do this 5 times on each side  · Cat and camel:  Place your hands and knees on the floor  Arch your back upward toward the ceiling and lower your head  Round out your spine as much as you can  Hold for 5 seconds  Lift your head upward and push your chest downward toward the floor  Hold for 5 seconds  Do 3 sets or as directed  · Wall squats:  Stand with your back against a wall  Tighten the muscles of your abdomen  Slowly lower your body until your knees are bent at a 45 degree angle  Hold this position for 5 seconds  Slowly move back up to a standing position  Repeat 10 times  · Curl up:  Lie on your back with your knees bent and feet flat on the floor  Place your hands, palms down, underneath the curve in your lower back  Next, with your elbows on the floor, lift your shoulders and chest 2 to 3 inches  Keep your head in line with your shoulders  Hold this position for 5 seconds   When you can do this exercise without pain for 10 to 15 seconds, you may add a rotation  While your shoulders and chest are lifted off the ground, turn slightly to the left and hold  Repeat on the other side  · Bird dog:  Place your hands and knees on the floor  Keep your wrists directly below your shoulders and your knees directly below your hips  Pull your belly button in toward your spine  Do not flatten or arch your back  Tighten your abdominal muscles  Raise one arm straight out so that it is aligned with your head  Next, raise the leg opposite your arm  Hold this position for 15 seconds  Lower your arm and leg slowly and change sides  Do 5 sets  © 2017 [facility] [facility] Information is for End User's use only and may not be sold, redistributed or otherwise used for commercial purposes  All illustrations and images included in CareNotes® are the copyrighted property of [employer] , [employer]  or Maurisio Aguilar  The above information is an  only  It is not intended as medical advice for individual conditions or treatments  Talk to your doctor, nurse or pharmacist before following any medical regimen to see if it is safe and effective for you  Try Tylenol arthritis 650 mg 2 tablets can take up to 4 daily on bad days for the arthritic and muscle pains

## 2019-10-13 RX ORDER — LIDOCAINE HYDROCHLORIDE 10 MG/ML
2 INJECTION, SOLUTION INFILTRATION; PERINEURAL
Status: COMPLETED | OUTPATIENT
Start: 2019-10-11 | End: 2019-10-11

## 2019-10-29 ENCOUNTER — DOCTOR'S OFFICE (OUTPATIENT)
Dept: URBAN - METROPOLITAN AREA CLINIC 137 | Facility: CLINIC | Age: 74
Setting detail: OPHTHALMOLOGY
End: 2019-10-29
Payer: COMMERCIAL

## 2019-10-29 ENCOUNTER — OPTICAL OFFICE (OUTPATIENT)
Dept: URBAN - METROPOLITAN AREA CLINIC 146 | Facility: CLINIC | Age: 74
Setting detail: OPHTHALMOLOGY
End: 2019-10-29
Payer: COMMERCIAL

## 2019-10-29 DIAGNOSIS — H52.223: ICD-10-CM

## 2019-10-29 DIAGNOSIS — H52.03: ICD-10-CM

## 2019-10-29 DIAGNOSIS — H52.4: ICD-10-CM

## 2019-10-29 PROCEDURE — V2203 LENS SPHCYL BIFOCAL 4.00D/.1: HCPCS | Performed by: OPTOMETRIST

## 2019-10-29 PROCEDURE — 92014 COMPRE OPH EXAM EST PT 1/>: CPT | Performed by: OPTOMETRIST

## 2019-10-29 PROCEDURE — V2020 VISION SVCS FRAMES PURCHASES: HCPCS | Performed by: OPTOMETRIST

## 2019-10-29 ASSESSMENT — REFRACTION_MANIFEST
OD_VA1: 20/20
OS_VA2: 20/
OD_VA3: 20/
OS_CYLINDER: -0.75
OS_VA3: 20/
OD_ADD: +2.50
OD_VA2: 20/
OU_VA: 20/20
OD_VA3: 20/
OD_SPHERE: +2.75
OS_VA3: 20/
OD_SPHERE: +2.00
OD_AXIS: 100
OS_VA2: 20/
OD_VA2: 20/
OU_VA: 20/
OD_VA1: 20/20
OS_VA1: 20/20
OS_SPHERE: +1.25
OD_CYLINDER: -1.00
OS_CYLINDER: -1.00
OS_SPHERE: +1.75
OS_AXIS: 80
OD_ADD: +2.50
OD_AXIS: 100
OD_CYLINDER: -0.50
OS_ADD: +2.50
OS_ADD: +2.50
OS_VA1: 20/20
OS_AXIS: 085

## 2019-10-29 ASSESSMENT — SPHEQUIV_DERIVED
OS_SPHEQUIV: 1.25
OS_SPHEQUIV: 0.875
OS_SPHEQUIV: 1.5
OD_SPHEQUIV: 2.25
OD_SPHEQUIV: 1.875
OD_SPHEQUIV: 1.75

## 2019-10-29 ASSESSMENT — REFRACTION_CURRENTRX
OS_OVR_VA: 20/
OD_OVR_VA: 20/
OD_OVR_VA: 20/
OS_AXIS: 101
OD_AXIS: 088
OD_CYLINDER: -0.75
OD_OVR_VA: 20/
OS_OVR_VA: 20/
OS_SPHERE: +1.50
OS_ADD: +2.25
OD_SPHERE: +1.75
OD_ADD: +2.25
OS_CYLINDER: -1.50
OD_VPRISM_DIRECTION: BF
OS_VPRISM_DIRECTION: BF
OS_OVR_VA: 20/

## 2019-10-29 ASSESSMENT — KERATOMETRY
OS_AXISANGLE_DEGREES: 087
OS_K1POWER_DIOPTERS: 44.00
OS_K2POWER_DIOPTERS: 44.50
OD_K1POWER_DIOPTERS: 44.25
OD_K2POWER_DIOPTERS: 44.75
OD_AXISANGLE_DEGREES: 103

## 2019-10-29 ASSESSMENT — AXIALLENGTH_DERIVED
OS_AL: 22.8533
OD_AL: 22.5429
OD_AL: 22.4103
OD_AL: 22.5875
OS_AL: 22.7623
OS_AL: 22.9912

## 2019-10-29 ASSESSMENT — REFRACTION_AUTOREFRACTION
OS_AXIS: 085
OD_AXIS: 124
OS_CYLINDER: -1.50
OD_SPHERE: +2.25
OS_SPHERE: +2.25
OD_CYLINDER: -0.75

## 2019-10-29 ASSESSMENT — TEAR BREAK UP TIME (TBUT)
OD_TBUT: 1+
OS_TBUT: 1+

## 2019-10-29 ASSESSMENT — CONFRONTATIONAL VISUAL FIELD TEST (CVF)
OD_FINDINGS: FULL
OS_FINDINGS: FULL

## 2019-10-29 ASSESSMENT — VISUAL ACUITY
OS_BCVA: 20/25-1
OD_BCVA: 20/20-2

## 2019-11-11 DIAGNOSIS — K21.9 GASTROESOPHAGEAL REFLUX DISEASE WITHOUT ESOPHAGITIS: ICD-10-CM

## 2019-11-11 RX ORDER — OMEPRAZOLE 40 MG/1
CAPSULE, DELAYED RELEASE ORAL
Qty: 90 CAPSULE | Refills: 4 | Status: SHIPPED | OUTPATIENT
Start: 2019-11-11 | End: 2020-12-04 | Stop reason: SDUPTHER

## 2020-02-10 ENCOUNTER — TELEPHONE (OUTPATIENT)
Dept: FAMILY MEDICINE CLINIC | Facility: CLINIC | Age: 75
End: 2020-02-10

## 2020-02-10 NOTE — TELEPHONE ENCOUNTER
I recommend a course of formal physical therapy at this time  If he does not think this is a good idea I could then recommend a consult for pain management for ? ?  Shots

## 2020-02-10 NOTE — TELEPHONE ENCOUNTER
Mr Krishnan Miko is requesting Dr Crook Ask look at his MRI done one year ago and tell him what he should do about his current back pain    [phone]

## 2020-02-12 ENCOUNTER — TELEPHONE (OUTPATIENT)
Dept: FAMILY MEDICINE CLINIC | Facility: CLINIC | Age: 75
End: 2020-02-12

## 2020-02-12 NOTE — TELEPHONE ENCOUNTER
Patient called back and left voicemail asking for Newton Yi to call him back about pain management information

## 2020-02-13 ENCOUNTER — TREATMENT (OUTPATIENT)
Dept: FAMILY MEDICINE CLINIC | Facility: CLINIC | Age: 75
End: 2020-02-13

## 2020-02-13 DIAGNOSIS — M54.50 LOW BACK PAIN, UNSPECIFIED BACK PAIN LATERALITY, UNSPECIFIED CHRONICITY, UNSPECIFIED WHETHER SCIATICA PRESENT: Primary | ICD-10-CM

## 2020-05-18 ENCOUNTER — CONSULT (OUTPATIENT)
Dept: PAIN MEDICINE | Facility: CLINIC | Age: 75
End: 2020-05-18
Payer: COMMERCIAL

## 2020-05-18 ENCOUNTER — HOSPITAL ENCOUNTER (OUTPATIENT)
Dept: RADIOLOGY | Facility: HOSPITAL | Age: 75
Discharge: HOME | End: 2020-05-18
Attending: ANESTHESIOLOGY
Payer: COMMERCIAL

## 2020-05-18 ENCOUNTER — TELEPHONE (OUTPATIENT)
Dept: RHEUMATOLOGY | Facility: CLINIC | Age: 75
End: 2020-05-18

## 2020-05-18 VITALS
WEIGHT: 204 LBS | DIASTOLIC BLOOD PRESSURE: 93 MMHG | SYSTOLIC BLOOD PRESSURE: 173 MMHG | HEART RATE: 50 BPM | TEMPERATURE: 98.5 F | BODY MASS INDEX: 27.86 KG/M2

## 2020-05-18 DIAGNOSIS — M25.551 BILATERAL HIP PAIN: ICD-10-CM

## 2020-05-18 DIAGNOSIS — M51.16 INTERVERTEBRAL DISC DISORDER WITH RADICULOPATHY OF LUMBAR REGION: ICD-10-CM

## 2020-05-18 DIAGNOSIS — M25.551 BILATERAL HIP PAIN: Primary | ICD-10-CM

## 2020-05-18 DIAGNOSIS — M25.552 BILATERAL HIP PAIN: ICD-10-CM

## 2020-05-18 DIAGNOSIS — M48.061 DEGENERATIVE LUMBAR SPINAL STENOSIS: ICD-10-CM

## 2020-05-18 DIAGNOSIS — M25.552 BILATERAL HIP PAIN: Primary | ICD-10-CM

## 2020-05-18 PROCEDURE — 1160F RVW MEDS BY RX/DR IN RCRD: CPT | Performed by: ANESTHESIOLOGY

## 2020-05-18 PROCEDURE — 99204 OFFICE O/P NEW MOD 45 MIN: CPT | Performed by: ANESTHESIOLOGY

## 2020-05-18 PROCEDURE — 4040F PNEUMOC VAC/ADMIN/RCVD: CPT | Performed by: ANESTHESIOLOGY

## 2020-05-18 PROCEDURE — 1036F TOBACCO NON-USER: CPT | Performed by: ANESTHESIOLOGY

## 2020-05-18 PROCEDURE — 73522 X-RAY EXAM HIPS BI 3-4 VIEWS: CPT

## 2020-05-19 ENCOUNTER — TELEPHONE (OUTPATIENT)
Dept: PAIN MEDICINE | Facility: CLINIC | Age: 75
End: 2020-05-19

## 2020-05-20 DIAGNOSIS — M16.0 PRIMARY OSTEOARTHRITIS OF BOTH HIPS: Primary | ICD-10-CM

## 2020-06-26 ENCOUNTER — OFFICE VISIT (OUTPATIENT)
Dept: FAMILY MEDICINE CLINIC | Facility: CLINIC | Age: 75
End: 2020-06-26
Payer: COMMERCIAL

## 2020-06-26 VITALS — HEIGHT: 72 IN | BODY MASS INDEX: 26.94 KG/M2 | WEIGHT: 198.9 LBS

## 2020-06-26 DIAGNOSIS — M54.50 LOW BACK PAIN, UNSPECIFIED BACK PAIN LATERALITY, UNSPECIFIED CHRONICITY, UNSPECIFIED WHETHER SCIATICA PRESENT: ICD-10-CM

## 2020-06-26 DIAGNOSIS — R03.0 ELEVATED BLOOD PRESSURE READING: Chronic | ICD-10-CM

## 2020-06-26 DIAGNOSIS — K21.9 GASTROESOPHAGEAL REFLUX DISEASE WITHOUT ESOPHAGITIS: Primary | ICD-10-CM

## 2020-06-26 DIAGNOSIS — Z00.00 MEDICARE ANNUAL WELLNESS VISIT, SUBSEQUENT: Chronic | ICD-10-CM

## 2020-06-26 DIAGNOSIS — E78.6 LOW HDL (UNDER 40): Chronic | ICD-10-CM

## 2020-06-26 PROCEDURE — 1160F RVW MEDS BY RX/DR IN RCRD: CPT | Performed by: FAMILY MEDICINE

## 2020-06-26 PROCEDURE — 3008F BODY MASS INDEX DOCD: CPT | Performed by: FAMILY MEDICINE

## 2020-06-26 PROCEDURE — 1101F PT FALLS ASSESS-DOCD LE1/YR: CPT | Performed by: FAMILY MEDICINE

## 2020-06-26 PROCEDURE — 1170F FXNL STATUS ASSESSED: CPT | Performed by: FAMILY MEDICINE

## 2020-06-26 PROCEDURE — G0439 PPPS, SUBSEQ VISIT: HCPCS | Performed by: FAMILY MEDICINE

## 2020-06-26 PROCEDURE — 99214 OFFICE O/P EST MOD 30 MIN: CPT | Performed by: FAMILY MEDICINE

## 2020-06-26 PROCEDURE — 3288F FALL RISK ASSESSMENT DOCD: CPT | Performed by: FAMILY MEDICINE

## 2020-06-26 PROCEDURE — 1036F TOBACCO NON-USER: CPT | Performed by: FAMILY MEDICINE

## 2020-06-26 PROCEDURE — 4040F PNEUMOC VAC/ADMIN/RCVD: CPT | Performed by: FAMILY MEDICINE

## 2020-07-12 DIAGNOSIS — M54.50 LOW BACK PAIN: ICD-10-CM

## 2020-07-13 ENCOUNTER — TELEPHONE (OUTPATIENT)
Dept: PAIN MEDICINE | Facility: CLINIC | Age: 75
End: 2020-07-13

## 2020-07-13 RX ORDER — IBUPROFEN 600 MG/1
TABLET ORAL
Qty: 270 TABLET | Refills: 3 | Status: SHIPPED | OUTPATIENT
Start: 2020-07-13 | End: 2020-12-04 | Stop reason: SDUPTHER

## 2020-07-13 NOTE — TELEPHONE ENCOUNTER
Patient has a lot of questions about his procedure, transportation, how long is procedure, Covid-19 testing and where will the injection be? He is scheduled for 7/14 at 2pm     Please advise  Thank you       [phone]

## 2020-09-04 DIAGNOSIS — T78.40XD ALLERGIC STATE, SUBSEQUENT ENCOUNTER: Primary | ICD-10-CM

## 2020-09-04 RX ORDER — IBUPROFEN 600 MG/1
TABLET ORAL
COMMUNITY
Start: 2018-06-18 | End: 2021-09-27 | Stop reason: SDUPTHER

## 2020-09-04 RX ORDER — OMEPRAZOLE 40 MG/1
CAPSULE, DELAYED RELEASE ORAL
COMMUNITY
Start: 2018-05-09 | End: 2021-01-13

## 2020-09-04 RX ORDER — FLUTICASONE PROPIONATE 50 MCG
1 SPRAY, SUSPENSION (ML) NASAL EVERY 12 HOURS
COMMUNITY
Start: 2017-11-13 | End: 2020-09-04 | Stop reason: SDUPTHER

## 2020-09-06 RX ORDER — FLUTICASONE PROPIONATE 50 MCG
1 SPRAY, SUSPENSION (ML) NASAL DAILY
Qty: 3 BOTTLE | Refills: 3 | Status: SHIPPED | OUTPATIENT
Start: 2020-09-06 | End: 2022-01-27

## 2020-09-09 ENCOUNTER — TELEPHONE (OUTPATIENT)
Dept: FAMILY MEDICINE CLINIC | Facility: CLINIC | Age: 75
End: 2020-09-09

## 2020-09-09 NOTE — TELEPHONE ENCOUNTER
Patient called stating that he read on morning call that Omeprazole can cause problem with bones  Patient wants to know if he needs to take Omeprazole or if there is any other alternatives   Please advise

## 2020-10-07 ENCOUNTER — TELEPHONE (OUTPATIENT)
Dept: FAMILY MEDICINE CLINIC | Facility: CLINIC | Age: 75
End: 2020-10-07

## 2020-10-09 ENCOUNTER — CLINICAL SUPPORT (OUTPATIENT)
Dept: FAMILY MEDICINE CLINIC | Facility: CLINIC | Age: 75
End: 2020-10-09
Payer: COMMERCIAL

## 2020-10-09 DIAGNOSIS — Z23 NEED FOR INFLUENZA VACCINATION: Primary | ICD-10-CM

## 2020-10-09 PROCEDURE — G0008 ADMIN INFLUENZA VIRUS VAC: HCPCS | Performed by: FAMILY MEDICINE

## 2020-10-09 PROCEDURE — 90662 IIV NO PRSV INCREASED AG IM: CPT | Performed by: FAMILY MEDICINE

## 2020-10-21 ENCOUNTER — TELEPHONE (OUTPATIENT)
Dept: FAMILY MEDICINE CLINIC | Facility: CLINIC | Age: 75
End: 2020-10-21

## 2020-12-04 ENCOUNTER — OFFICE VISIT (OUTPATIENT)
Dept: FAMILY MEDICINE CLINIC | Facility: CLINIC | Age: 75
End: 2020-12-04
Payer: COMMERCIAL

## 2020-12-04 VITALS
BODY MASS INDEX: 28.22 KG/M2 | WEIGHT: 197.1 LBS | SYSTOLIC BLOOD PRESSURE: 184 MMHG | HEART RATE: 55 BPM | HEIGHT: 70 IN | TEMPERATURE: 97.6 F | OXYGEN SATURATION: 97 % | DIASTOLIC BLOOD PRESSURE: 104 MMHG | RESPIRATION RATE: 18 BRPM

## 2020-12-04 DIAGNOSIS — E78.6 LOW HDL (UNDER 40): Chronic | ICD-10-CM

## 2020-12-04 DIAGNOSIS — M54.50 LOW BACK PAIN, UNSPECIFIED BACK PAIN LATERALITY, UNSPECIFIED CHRONICITY, UNSPECIFIED WHETHER SCIATICA PRESENT: ICD-10-CM

## 2020-12-04 DIAGNOSIS — R03.0 ELEVATED BLOOD PRESSURE READING: Chronic | ICD-10-CM

## 2020-12-04 DIAGNOSIS — K21.9 GASTROESOPHAGEAL REFLUX DISEASE WITHOUT ESOPHAGITIS: Primary | ICD-10-CM

## 2020-12-04 PROBLEM — D49.9 NEOPLASM: Status: RESOLVED | Noted: 2019-01-16 | Resolved: 2020-12-04

## 2020-12-04 PROBLEM — C44.1992 OTHER SPECIFIED MALIGNANT NEOPLASM OF SKIN OF LEFT LOWER EYELID, INCLUDING CANTHUS: Status: RESOLVED | Noted: 2019-01-22 | Resolved: 2020-12-04

## 2020-12-04 PROCEDURE — 99214 OFFICE O/P EST MOD 30 MIN: CPT | Performed by: FAMILY MEDICINE

## 2020-12-04 PROCEDURE — 1036F TOBACCO NON-USER: CPT | Performed by: FAMILY MEDICINE

## 2020-12-04 PROCEDURE — 1160F RVW MEDS BY RX/DR IN RCRD: CPT | Performed by: FAMILY MEDICINE

## 2020-12-04 PROCEDURE — 3008F BODY MASS INDEX DOCD: CPT | Performed by: FAMILY MEDICINE

## 2020-12-22 ENCOUNTER — DOCTOR'S OFFICE (OUTPATIENT)
Dept: URBAN - METROPOLITAN AREA CLINIC 137 | Facility: CLINIC | Age: 75
Setting detail: OPHTHALMOLOGY
End: 2020-12-22
Payer: COMMERCIAL

## 2020-12-22 ENCOUNTER — OPTICAL OFFICE (OUTPATIENT)
Dept: URBAN - METROPOLITAN AREA CLINIC 146 | Facility: CLINIC | Age: 75
Setting detail: OPHTHALMOLOGY
End: 2020-12-22
Payer: COMMERCIAL

## 2020-12-22 DIAGNOSIS — H52.4: ICD-10-CM

## 2020-12-22 DIAGNOSIS — H52.03: ICD-10-CM

## 2020-12-22 DIAGNOSIS — H52.223: ICD-10-CM

## 2020-12-22 PROBLEM — H25.13 CATARACT NUCLEAR SCLEROSIS ; BOTH EYES: Status: ACTIVE | Noted: 2017-10-26

## 2020-12-22 PROBLEM — H04.123 DRY EYE; BOTH EYES: Status: ACTIVE | Noted: 2017-10-26

## 2020-12-22 PROCEDURE — 92014 COMPRE OPH EXAM EST PT 1/>: CPT | Performed by: OPTOMETRIST

## 2020-12-22 PROCEDURE — V2750 ANTI-REFLECTIVE COATING: HCPCS | Performed by: OPTOMETRIST

## 2020-12-22 PROCEDURE — V2025 EYEGLASSES DELUX FRAMES: HCPCS | Performed by: OPTOMETRIST

## 2020-12-22 PROCEDURE — V2203 LENS SPHCYL BIFOCAL 4.00D/.1: HCPCS | Performed by: OPTOMETRIST

## 2020-12-22 PROCEDURE — V2020 VISION SVCS FRAMES PURCHASES: HCPCS | Performed by: OPTOMETRIST

## 2020-12-22 ASSESSMENT — KERATOMETRY
OS_K1POWER_DIOPTERS: 44.00
OS_K2POWER_DIOPTERS: 44.50
OD_K2POWER_DIOPTERS: 44.75
OS_AXISANGLE_DEGREES: 087
OD_K1POWER_DIOPTERS: 44.25
OD_AXISANGLE_DEGREES: 103

## 2020-12-22 ASSESSMENT — REFRACTION_MANIFEST
OD_SPHERE: +2.75
OS_AXIS: 80
OS_ADD: +2.50
OD_AXIS: 100
OD_CYLINDER: -1.00
OD_SPHERE: +2.75
OS_ADD: +2.50
OS_SPHERE: +1.75
OD_AXIS: 100
OD_VA1: 20/20
OS_VA1: 20/20
OD_VA1: 20/20
OS_SPHERE: +2.25
OD_ADD: +2.50
OU_VA: 20/20
OS_CYLINDER: -1.50
OD_ADD: +2.50
OD_CYLINDER: -1.00
OS_AXIS: 80
OS_CYLINDER: -1.00
OS_VA1: 20/20

## 2020-12-22 ASSESSMENT — VISUAL ACUITY
OS_BCVA: 20/20
OD_BCVA: 20/20

## 2020-12-22 ASSESSMENT — REFRACTION_AUTOREFRACTION
OD_CYLINDER: -0.75
OS_SPHERE: +2.00
OS_CYLINDER: -1.25
OS_AXIS: 085
OD_AXIS: 110
OD_SPHERE: +2.75

## 2020-12-22 ASSESSMENT — REFRACTION_CURRENTRX
OD_SPHERE: +2.75
OS_SPHERE: +1.75
OD_CYLINDER: -1.00
OD_ADD: +2.50
OD_OVR_VA: 20/
OD_AXIS: 104
OD_VPRISM_DIRECTION: SV
OS_VPRISM_DIRECTION: BF
OS_CYLINDER: -1.00
OS_ADD: +2.50
OS_AXIS: 076
OS_OVR_VA: 20/

## 2020-12-22 ASSESSMENT — SPHEQUIV_DERIVED
OD_SPHEQUIV: 2.25
OS_SPHEQUIV: 1.25
OD_SPHEQUIV: 2.25
OS_SPHEQUIV: 1.5
OS_SPHEQUIV: 1.375
OD_SPHEQUIV: 2.375

## 2020-12-22 ASSESSMENT — CONFRONTATIONAL VISUAL FIELD TEST (CVF)
OS_FINDINGS: FULL
OD_FINDINGS: FULL

## 2020-12-22 ASSESSMENT — TEAR BREAK UP TIME (TBUT)
OS_TBUT: 1+
OD_TBUT: 1+

## 2020-12-22 ASSESSMENT — AXIALLENGTH_DERIVED
OD_AL: 22.3664
OD_AL: 22.4103
OS_AL: 22.8533
OS_AL: 22.8077
OS_AL: 22.7623
OD_AL: 22.4103

## 2020-12-28 ENCOUNTER — TELEPHONE (OUTPATIENT)
Dept: FAMILY MEDICINE CLINIC | Facility: CLINIC | Age: 75
End: 2020-12-28

## 2020-12-28 ENCOUNTER — TREATMENT (OUTPATIENT)
Dept: FAMILY MEDICINE CLINIC | Facility: CLINIC | Age: 75
End: 2020-12-28

## 2020-12-28 DIAGNOSIS — M54.9 SPINAL PAIN: Primary | ICD-10-CM

## 2021-01-13 DIAGNOSIS — K21.9 GASTROESOPHAGEAL REFLUX DISEASE WITHOUT ESOPHAGITIS: Primary | ICD-10-CM

## 2021-01-13 RX ORDER — OMEPRAZOLE 40 MG/1
CAPSULE, DELAYED RELEASE ORAL
Qty: 90 CAPSULE | Refills: 3 | Status: SHIPPED | OUTPATIENT
Start: 2021-01-13 | End: 2022-01-10

## 2021-02-14 ENCOUNTER — IMMUNIZATIONS (OUTPATIENT)
Dept: FAMILY MEDICINE CLINIC | Facility: HOSPITAL | Age: 76
End: 2021-02-14

## 2021-02-14 DIAGNOSIS — Z23 ENCOUNTER FOR IMMUNIZATION: Primary | ICD-10-CM

## 2021-02-14 PROCEDURE — 0001A SARS-COV-2 / COVID-19 MRNA VACCINE (PFIZER-BIONTECH) 30 MCG: CPT

## 2021-02-14 PROCEDURE — 91300 SARS-COV-2 / COVID-19 MRNA VACCINE (PFIZER-BIONTECH) 30 MCG: CPT

## 2021-02-20 PROBLEM — G51.0 BELL'S PALSY: Noted: 2021-02-20

## 2021-02-22 ENCOUNTER — TELEPHONE (OUTPATIENT)
Dept: FAMILY MEDICINE CLINIC | Facility: CLINIC | Age: 76
End: 2021-02-22

## 2021-02-22 ENCOUNTER — TREATMENT (OUTPATIENT)
Dept: FAMILY MEDICINE CLINIC | Facility: CLINIC | Age: 76
End: 2021-02-22
Payer: COMMERCIAL

## 2021-02-22 DIAGNOSIS — G51.0 BELL'S PALSY: Primary | ICD-10-CM

## 2021-02-22 PROCEDURE — 99213 OFFICE O/P EST LOW 20 MIN: CPT | Performed by: FAMILY MEDICINE

## 2021-02-22 RX ORDER — PREDNISONE 10 MG/1
TABLET ORAL
Qty: 15 TABLET | Refills: 0 | Status: SHIPPED | OUTPATIENT
Start: 2021-02-22 | End: 2022-01-27

## 2021-02-22 NOTE — TELEPHONE ENCOUNTER
Patient calling, gave Dr Roberto Vasquez wrong pharmacy   [facility] needs [facility] on [address] [place] [address] crossing [facility] in [place]

## 2021-02-22 NOTE — PROGRESS NOTES
Virtual Regular Visit    It was my intent to perform this visit via video technology but the patient was not able to do a video connection so the visit was completed via audio telephone only  Assessment/Plan:  Bell's palsy with inability to close eye  Patient developed some irritation of the left eye over the weekend  Next day he was noted to have a left facial droop  Seen at [facility] in [place] emergency room and diagnosed with Bell's palsy  Prednisone 60 x 5 days was recommended and eye patching  He has no new symptoms today or rash  Problem List Items Addressed This Visit        Nervous and Auditory    Bell's palsy - Primary    Relevant Medications    predniSONE 10 mg tablet               Reason for visit is   Chief Complaint   Patient presents with    Virtual Regular Visit        Encounter provider Sha Owens MD    Provider located at [address] [address]      Recent Visits  No visits were found meeting these conditions  Showing recent visits within past 7 days and meeting all other requirements     Today's Visits  Date Type Provider Dept   02/22/21 Telephone Sha Owens MD [facility]   02/22/21 Telephone [place] Marleny, [address] [place] [address] today's visits and meeting all other requirements     Future Appointments  No visits were found meeting these conditions  Showing future appointments within next 150 days and meeting all other requirements        The patient was identified by name and date of birth  India Correa was informed that this is a telemedicine visit and that the visit is being conducted through   He acknowledged consent and understanding of privacy and security of the video platform  The patient has agreed to participate and understands they can discontinue the visit at any time  Patient is aware this is a billable service  Hyacinth An is a 77 y o  male     HPI     Past Medical History:   Diagnosis Date    Chronic pain disorder     back    GERD (gastroesophageal reflux disease)     Seasonal allergies        Past Surgical History:   Procedure Laterality Date    COLONOSCOPY      KNEE ARTHROCENTESIS Right     LATERAL JOINT LINE    [place] ADJ TISS XFER LID,NOS,EAR <10 SQCM Left 1/22/2019    Procedure: EXCISION OF BASAL CELL CA LOWER EYELID W/FROZEN SECTION, RECONSTRUCTION;  Surgeon: Silvia Weaver MD;  Location: [address] [place] [address] OR;  Service: Plastics       Current Outpatient Medications   Medication Sig Dispense Refill    fluticasone (FLONASE) 50 mcg/act nasal spray 1 spray into each nostril daily 3 Bottle 3    ibuprofen (MOTRIN) 600 mg tablet take 1 tablet (600MG)  by oral route 3 times every day with food      omeprazole (PriLOSEC) 40 MG capsule TAKE 1 CAPSULE DAILY 90 capsule 3    predniSONE 10 mg tablet 5  tabs first day after the 5 days of 60 mg; 4tabs  the next day; 3 the next day; 2 the next day; one the last day  15 tablet 0     No current facility-administered medications for this visit  Allergies   Allergen Reactions    Prednisone        Review of Systems   Constitutional: Negative for chills and fever  HENT: Negative for congestion, ear pain, rhinorrhea, sinus pain and sore throat  Eyes: Negative for visual disturbance  Respiratory: Negative for cough, shortness of breath and wheezing  Cardiovascular: Negative for chest pain  Gastrointestinal: Negative for constipation and diarrhea  Skin: Negative for rash  Video Exam    There were no vitals filed for this visit  Physical Exam  Constitutional:       General: He is not in acute distress  Appearance: He is well-developed  Eyes:      General:         Left eye: Discharge present  Conjunctiva/sclera: Conjunctivae normal    Pulmonary:      Effort: Pulmonary effort is normal  No respiratory distress     Neurological:      Mental Status: He is alert and oriented to person, place, and time  Psychiatric:         Behavior: Behavior normal          Thought Content: Thought content normal          Judgment: Judgment normal             I researched recent recommendations on Bell's palsy  He will take prednisone 60 x 5 and then [phone] and off  He will keep the eye patch during the day until he is able to close it totally  Worsening eye symptoms he will see an eye doctor  I have him scheduled in another 7-10 days to see me  VIRTUAL VISIT DISCLAIMER    Felicity Musa acknowledges that he has consented to an online visit or consultation  He understands that the online visit is based solely on information provided by him, and that, in the absence of a face-to-face physical evaluation by the physician, the diagnosis he receives is both limited and provisional in terms of accuracy and completeness  This is not intended to replace a full medical face-to-face evaluation by the physician  Felicity Musa understands and accepts these terms

## 2021-02-22 NOTE — TELEPHONE ENCOUNTER
Patient called , seen in ER for Bell's palsy 2/21/21  Given Prednisone 20 mg take 3 tabs daily for 5 days  Patient states he is allergic to Prednisone, did not get any reaction until last night when he was lying in bed he had an episode of palpitation when he applied heating pad on his shoulder blades  Patient wants to know if this could be the chemical on his body that could create the palpitations or the virus on his body  Please advise  Patient only wants to see Dr Roberto Vasquez for the appointment   [phone]

## 2021-02-25 ENCOUNTER — TELEPHONE (OUTPATIENT)
Dept: FAMILY MEDICINE CLINIC | Facility: CLINIC | Age: 76
End: 2021-02-25

## 2021-03-02 ENCOUNTER — TELEMEDICINE (OUTPATIENT)
Dept: FAMILY MEDICINE CLINIC | Facility: CLINIC | Age: 76
End: 2021-03-02
Payer: COMMERCIAL

## 2021-03-02 VITALS — WEIGHT: 197 LBS | BODY MASS INDEX: 28.47 KG/M2

## 2021-03-02 DIAGNOSIS — G51.0 BELL'S PALSY: Primary | ICD-10-CM

## 2021-03-02 PROCEDURE — 99213 OFFICE O/P EST LOW 20 MIN: CPT | Performed by: FAMILY MEDICINE

## 2021-03-02 RX ORDER — MINERAL OIL AND PETROLATUM 150; 830 MG/G; MG/G
OINTMENT OPHTHALMIC 2 TIMES DAILY
COMMUNITY
End: 2022-01-27

## 2021-03-02 NOTE — TELEPHONE ENCOUNTER
Patient following up, patient would like to see Dr Akash Singer this week  Patient doesn't want to see another provider  Please advise 
Patient stopped into office to give information for Dr Stephani Brown  First thing is patient has one day left of prednisone, and is scheduled for his second covid vaccine Thursday  Is this a conflict? Secondly, patient stating he has been using eye drops, and following other instructions from Dr Stephani Brown including the patch and eye is improving but not resolved  Patient unable to drive  Patient has to put pressure on the area below the eye to keep it open  Patient still really wants to see Dr Stephani Brwon this week  Advise about scheduling with his eye doctor, he will call and set up 
Schedule for virtual around 11today
Scheduled
Spoke to patient, was to be seen per Dr Arianne Davalos in 7- 10 day, no appts available, refused to see any other provider here  Scheduled patient for 3/18/21 with Dr Arianne Davalos and told him go to the eye doctor per Dr Arianne Davalos note of 2/22/21 if symptoms get worse  Patient agreed 
patient called and is questioning the script and states he has a day left of the original and doesn't know what to do   Also has other questions
Dersy

## 2021-03-02 NOTE — PROGRESS NOTES
Virtual Regular Visit      Assessment/Plan:  F/U [place] palsy    Muscular movement improved somewhat / no globe pain / visual changes    Completing pred today     Problem List Items Addressed This Visit        Nervous and Auditory    Bell's palsy - Primary               Reason for visit is   Chief Complaint   Patient presents with    Virtual Brief Visit    left eye pain     irritation x 2 days    bellls palsy    Virtual Regular Visit        Encounter provider Jv Smith MD    Provider located at [address] [place] [id number]      Recent Visits  Date Type Provider Dept   02/25/21 Telephone Jv Smith  [facility] recent visits within past 7 days and meeting all other requirements     Today's Visits  Date Type Provider Dept   03/02/21 Telemedicine Jv Smith  [facility] today's visits and meeting all other requirements     Future Appointments  No visits were found meeting these conditions  Showing future appointments within next 150 days and meeting all other requirements        The patient was identified by name and date of birth  Felicity Musa was informed that this is a telemedicine visit and that the visit is being conducted through [facility] and patient was informed that this is a secure, HIPAA-compliant platform  He agrees to proceed     My office door was closed  No one else was in the room  He acknowledged consent and understanding of privacy and security of the video platform  The patient has agreed to participate and understands they can discontinue the visit at any time  Patient is aware this is a billable service  Subjective  Felicity Musa is a 77 y o  male            HPI     Past Medical History:   Diagnosis Date    Chronic pain disorder     back    GERD (gastroesophageal reflux disease)     Seasonal allergies        Past Surgical History:   Procedure Laterality Date    COLONOSCOPY      KNEE ARTHROCENTESIS Right     LATERAL JOINT LINE    AL ADJ TISS XFER LID,NOS,EAR <10 SQCM Left 1/22/2019    Procedure: EXCISION OF BASAL CELL CA LOWER EYELID W/FROZEN SECTION, RECONSTRUCTION;  Surgeon: Radha Pop MD;  Location: [facility] MAIN OR;  Service: Plastics       Current Outpatient Medications   Medication Sig Dispense Refill    artificial tear (LUBRIFRESH P M ) 83-15 % ophthalmic ointment 2 (two) times a day      fluticasone (FLONASE) 50 mcg/act nasal spray 1 spray into each nostril daily 3 Bottle 3    ibuprofen (MOTRIN) 600 mg tablet take 1 tablet (600MG)  by oral route 3 times every day with food      omeprazole (PriLOSEC) 40 MG capsule TAKE 1 CAPSULE DAILY 90 capsule 3    predniSONE 10 mg tablet 5  tabs first day after the 5 days of 60 mg; 4tabs  the next day; 3 the next day; 2 the next day; one the last day  15 tablet 0     No current facility-administered medications for this visit  Allergies   Allergen Reactions    Prednisone        Review of Systems    Video Exam    Vitals:    03/02/21 1107   Weight: 89 4 kg (197 lb)       Physical Exam  Constitutional:       General: He is not in acute distress  Appearance: He is well-developed  Pulmonary:      Effort: Pulmonary effort is normal  No respiratory distress  Neurological:      Mental Status: He is alert and oriented to person, place, and time  Psychiatric:         Behavior: Behavior normal          Thought Content: Thought content normal          Judgment: Judgment normal           It was my intent to perform this visit via video technology but the patient was not able to do a video connection so the visit was completed via audio telephone only  I spent 15 minutes directly with the patient during this visit      VIRTUAL VISIT DISCLAIMER    Erasto Becerra acknowledges that he has consented to an online visit or consultation   He understands that the online visit is based solely on information provided by him, and that, in the absence of a face-to-face physical evaluation by the physician, the diagnosis he receives is both limited and provisional in terms of accuracy and completeness  This is not intended to replace a full medical face-to-face evaluation by the physician  Isabel Better understands and accepts these terms

## 2021-03-10 ENCOUNTER — TELEPHONE (OUTPATIENT)
Dept: FAMILY MEDICINE CLINIC | Facility: CLINIC | Age: 76
End: 2021-03-10

## 2021-03-10 ENCOUNTER — TREATMENT (OUTPATIENT)
Dept: FAMILY MEDICINE CLINIC | Facility: CLINIC | Age: 76
End: 2021-03-10
Payer: COMMERCIAL

## 2021-03-10 DIAGNOSIS — G51.0 BELL'S PALSY: Primary | ICD-10-CM

## 2021-03-10 PROCEDURE — 99212 OFFICE O/P EST SF 10 MIN: CPT | Performed by: FAMILY MEDICINE

## 2021-03-10 NOTE — PROGRESS NOTES
Virtual Regular Visit      Assessment/Plan:  [place] Palsy ; still muscular weakness but better eye closure; using artificial tears ; ?? should  Get the booster dose of vaccine   Problem List Items Addressed This Visit        Nervous and Auditory    Bell's palsy - Primary               Reason for visit is   Chief Complaint   Patient presents with    Virtual Regular Visit        Encounter provider Glade Frankel, MD    Provider located at [address] [address]      Recent Visits  No visits were found meeting these conditions  Showing recent visits within past 7 days and meeting all other requirements     Today's Visits  Date Type Provider Dept   03/10/21 Telephone Glade Frankel,  [facility] today's visits and meeting all other requirements     Future Appointments  No visits were found meeting these conditions  Showing future appointments within next 150 days and meeting all other requirements        The patient was identified by name and date of birth  [facility] was informed that this is a telemedicine visit and that the visit is being conducted through [facility] and patient was informed that this is a secure, HIPAA-compliant platform  He agrees to proceed     My office door was closed  No one else was in the room  He acknowledged consent and understanding of privacy and security of the video platform  The patient has agreed to participate and understands they can discontinue the visit at any time  Patient is aware this is a billable service       Vanda Galicia is a 77 y o  male       HPI     Past Medical History:   Diagnosis Date    Chronic pain disorder     back    GERD (gastroesophageal reflux disease)     Seasonal allergies        Past Surgical History:   Procedure Laterality Date    COLONOSCOPY      KNEE ARTHROCENTESIS Right     LATERAL JOINT LINE    OR ADJ TISS XFER LID,NOS,EAR <10 SQCM Left 1/22/2019    Procedure: EXCISION OF BASAL CELL CA LOWER EYELID W/FROZEN SECTION, RECONSTRUCTION;  Surgeon: Lam Cruz MD;  Location: [address] [place] [address];  Service: Plastics       Current Outpatient Medications   Medication Sig Dispense Refill    artificial tear (LUBRIFRESH P M ) 83-15 % ophthalmic ointment 2 (two) times a day      fluticasone (FLONASE) 50 mcg/act nasal spray 1 spray into each nostril daily 3 Bottle 3    ibuprofen (MOTRIN) 600 mg tablet take 1 tablet (600MG)  by oral route 3 times every day with food      omeprazole (PriLOSEC) 40 MG capsule TAKE 1 CAPSULE DAILY 90 capsule 3    predniSONE 10 mg tablet 5  tabs first day after the 5 days of 60 mg; 4tabs  the next day; 3 the next day; 2 the next day; one the last day  15 tablet 0     No current facility-administered medications for this visit  Allergies   Allergen Reactions    Prednisone        Review of Systems    Video Exam    There were no vitals filed for this visit  Physical Exam      we spent 10 minutes discussing his recent shingles etcetera    I spent 10 minutes directly with the patient during this visit      VIRTUAL VISIT DISCLAIMER    Nataliya Munguia acknowledges that he has consented to an online visit or consultation  He understands that the online visit is based solely on information provided by him, and that, in the absence of a face-to-face physical evaluation by the physician, the diagnosis he receives is both limited and provisional in terms of accuracy and completeness  This is not intended to replace a full medical face-to-face evaluation by the physician  Nataliya Munguia understands and accepts these terms

## 2021-03-10 NOTE — TELEPHONE ENCOUNTER
Spoke with patient, mentioned going to urgent care for his eye pain  Patient also is questioning getting second covid vaccine tomorrow, since he feels he developed bells palsy from the first vaccine    Please advise

## 2021-03-10 NOTE — TELEPHONE ENCOUNTER
Patient calling to see if he can come to the office to see Dr Wileen Dakin, patient's wife took over phone and is crying and insisting he needs to be seen today by an MD not a nurse practicioner because she's a nurse and he needs to be seen by a medical doctor  She states if he is not seen she will call [facility]/ Trev Fitzpatrick and find someone else to see him right away  Please advise  First available appointment was Monday 3/15 which was acceptable to patient but not to wife

## 2021-03-15 ENCOUNTER — OFFICE VISIT (OUTPATIENT)
Dept: FAMILY MEDICINE CLINIC | Facility: CLINIC | Age: 76
End: 2021-03-15
Payer: COMMERCIAL

## 2021-03-15 VITALS
BODY MASS INDEX: 28.4 KG/M2 | DIASTOLIC BLOOD PRESSURE: 90 MMHG | WEIGHT: 198.4 LBS | OXYGEN SATURATION: 98 % | TEMPERATURE: 98.2 F | SYSTOLIC BLOOD PRESSURE: 170 MMHG | HEART RATE: 53 BPM | HEIGHT: 70 IN

## 2021-03-15 DIAGNOSIS — G51.0 BELL'S PALSY: Primary | ICD-10-CM

## 2021-03-15 PROCEDURE — 1036F TOBACCO NON-USER: CPT | Performed by: FAMILY MEDICINE

## 2021-03-15 PROCEDURE — 1160F RVW MEDS BY RX/DR IN RCRD: CPT | Performed by: FAMILY MEDICINE

## 2021-03-15 PROCEDURE — 1101F PT FALLS ASSESS-DOCD LE1/YR: CPT | Performed by: FAMILY MEDICINE

## 2021-03-15 PROCEDURE — 99213 OFFICE O/P EST LOW 20 MIN: CPT | Performed by: FAMILY MEDICINE

## 2021-03-15 PROCEDURE — 3288F FALL RISK ASSESSMENT DOCD: CPT | Performed by: FAMILY MEDICINE

## 2021-03-15 PROCEDURE — 3725F SCREEN DEPRESSION PERFORMED: CPT | Performed by: FAMILY MEDICINE

## 2021-03-15 PROCEDURE — 3008F BODY MASS INDEX DOCD: CPT | Performed by: FAMILY MEDICINE

## 2021-03-15 NOTE — PATIENT INSTRUCTIONS
Continue the eye drops on a regular basis and continue to patch at night  Keep your eye doctor appointment for later this week  Continue the good massaging and use of the left facial muscles as we described at the office  If you feel formal physical therapy would be of value call me and we can arrange it

## 2021-03-15 NOTE — PROGRESS NOTES
Assessment/Plan:    No problem-specific Assessment & Plan notes found for this encounter  Diagnoses and all orders for this visit:    Bell's palsy          Subjective:      Patient ID: Ernst Pacheco is a 77 y o  male  [place] Palsy : 2-3 wks ; mild improvement since then ; still unable to totally close the eye ; vision OK ; seeing eye doc Wed  This week   The following portions of the patient's history were reviewed and updated as appropriate: allergies, current medications, past family history, past medical history, past social history, past surgical history and problem list     Review of Systems   HENT:        Facial pain in area of weakness         Objective:  Vitals:    03/15/21 1138   BP: 170/90   BP Location: Left arm   Patient Position: Sitting   Cuff Size: Large   Pulse: (!) 53   Temp: 98 2 °F (36 8 °C)   TempSrc: Temporal   SpO2: 98%   Weight: 90 kg (198 lb 6 4 oz)   Height: 5' 9 75" (1 772 m)      Physical Exam  Constitutional:       Appearance: He is well-developed  He is not diaphoretic  HENT:      Head: Normocephalic and atraumatic  Eyes:      Conjunctiva/sclera: Conjunctivae normal    Skin:     General: Skin is warm and dry  Neurological:      Mental Status: He is alert and oriented to person, place, and time  Comments: L facial weakness; unable to totally close eye

## 2021-03-31 ENCOUNTER — IMMUNIZATIONS (OUTPATIENT)
Dept: FAMILY MEDICINE CLINIC | Facility: HOSPITAL | Age: 76
End: 2021-03-31

## 2021-03-31 DIAGNOSIS — Z23 ENCOUNTER FOR IMMUNIZATION: Primary | ICD-10-CM

## 2021-03-31 PROCEDURE — 0002A SARS-COV-2 / COVID-19 MRNA VACCINE (PFIZER-BIONTECH) 30 MCG: CPT

## 2021-03-31 PROCEDURE — 91300 SARS-COV-2 / COVID-19 MRNA VACCINE (PFIZER-BIONTECH) 30 MCG: CPT

## 2021-04-08 ENCOUNTER — TELEPHONE (OUTPATIENT)
Dept: FAMILY MEDICINE CLINIC | Facility: CLINIC | Age: 76
End: 2021-04-08

## 2021-04-08 NOTE — TELEPHONE ENCOUNTER
Patient calling because he is not able to blink his left eye and needs to know what he can do about it

## 2021-04-08 NOTE — TELEPHONE ENCOUNTER
There is no specific treatment that will make this return  He needs to continue the massaged and exercises that I reviewed with him that the last office visit  I strongly encouraged him to contact his eye doctor as well to see if there is any treatment for his eyeball that may be recommended this point

## 2021-04-08 NOTE — TELEPHONE ENCOUNTER
Patient notified   Was seen by eye doctor yesterday and was told to keep up with the exercises and massage

## 2021-06-03 ENCOUNTER — RA CDI HCC (OUTPATIENT)
Dept: OTHER | Facility: HOSPITAL | Age: 76
End: 2021-06-03

## 2021-06-03 NOTE — PROGRESS NOTES
[facility]          Chart reviewed, no opportunity found: CHART REVIEWED, NO OPPORTUNITY FOUND              Patients insurance company:  [employer] [facility] (Medicare Advantage and Commercial)

## 2021-06-10 ENCOUNTER — OFFICE VISIT (OUTPATIENT)
Dept: FAMILY MEDICINE CLINIC | Facility: CLINIC | Age: 76
End: 2021-06-10
Payer: COMMERCIAL

## 2021-06-10 VITALS
HEART RATE: 64 BPM | DIASTOLIC BLOOD PRESSURE: 80 MMHG | SYSTOLIC BLOOD PRESSURE: 190 MMHG | BODY MASS INDEX: 28.15 KG/M2 | HEIGHT: 70 IN | TEMPERATURE: 97.7 F | WEIGHT: 196.6 LBS | OXYGEN SATURATION: 98 %

## 2021-06-10 DIAGNOSIS — G51.0 BELL'S PALSY: ICD-10-CM

## 2021-06-10 DIAGNOSIS — R03.0 ELEVATED BLOOD PRESSURE READING: Chronic | ICD-10-CM

## 2021-06-10 DIAGNOSIS — E78.6 LOW HDL (UNDER 40): Chronic | ICD-10-CM

## 2021-06-10 DIAGNOSIS — K21.9 GASTROESOPHAGEAL REFLUX DISEASE WITHOUT ESOPHAGITIS: ICD-10-CM

## 2021-06-10 PROCEDURE — 3288F FALL RISK ASSESSMENT DOCD: CPT | Performed by: FAMILY MEDICINE

## 2021-06-10 PROCEDURE — 1036F TOBACCO NON-USER: CPT | Performed by: FAMILY MEDICINE

## 2021-06-10 PROCEDURE — 1160F RVW MEDS BY RX/DR IN RCRD: CPT | Performed by: FAMILY MEDICINE

## 2021-06-10 PROCEDURE — 3725F SCREEN DEPRESSION PERFORMED: CPT | Performed by: FAMILY MEDICINE

## 2021-06-10 PROCEDURE — 1101F PT FALLS ASSESS-DOCD LE1/YR: CPT | Performed by: FAMILY MEDICINE

## 2021-06-10 PROCEDURE — 3008F BODY MASS INDEX DOCD: CPT | Performed by: FAMILY MEDICINE

## 2021-06-10 PROCEDURE — 99214 OFFICE O/P EST MOD 30 MIN: CPT | Performed by: FAMILY MEDICINE

## 2021-06-10 PROCEDURE — 1170F FXNL STATUS ASSESSED: CPT | Performed by: FAMILY MEDICINE

## 2021-06-10 PROCEDURE — 1125F AMNT PAIN NOTED PAIN PRSNT: CPT | Performed by: FAMILY MEDICINE

## 2021-06-10 NOTE — PROGRESS NOTES
Assessment/Plan:    No problem-specific Assessment & Plan notes found for this encounter  Diagnoses and all orders for this visit:    BMI 28 0-28 9,adult    Gastroesophageal reflux disease without esophagitis    Bell's palsy    Low HDL (under 40)    Elevated blood pressure reading          Subjective:      Patient ID: Summer Walters is a 77 y o  male  PATIENT RETURNS FOR FOLLOW-UP OF CHRONIC MEDICAL CONDITIONS  NO HOSPITAL STAYS OR EMERGENCY VISITS RECENTLY  MEDS WERE REVIEWED AND NO SIDE EFFECTS  NO NEW ISSUES  UNLESS NOTED BELOW  NO NEW MEDICAL PROVIDER REPORTED  THE CHRONIC DISEASES LISTED ABOVE ARE STABLE AND UNCHANGED/ THE PLAN OF CARE FOR THOSE WILL REMAIN UNCHANGED UNLESS NOTED BELOW  The following portions of the patient's history were reviewed and updated as appropriate: allergies, current medications, past family history, past medical history, past social history, past surgical history and problem list     Review of Systems   Constitutional: Negative for activity change and appetite change  HENT: Negative for trouble swallowing  Mild L facial weakness persists   Eyes: Negative for visual disturbance  Respiratory: Negative for cough and shortness of breath  Cardiovascular: Negative for chest pain, palpitations and leg swelling  Gastrointestinal: Negative for abdominal pain and blood in stool  Endocrine: Negative for polyuria  Genitourinary: Negative for difficulty urinating and hematuria  Skin: Negative for rash  Neurological: Negative for dizziness  Psychiatric/Behavioral: Negative for behavioral problems  Objective:  Vitals:    06/10/21 1300   BP: (!) 190/80   BP Location: Left arm   Patient Position: Sitting   Cuff Size: Large   Pulse: 64   Temp: 97 7 °F (36 5 °C)   TempSrc: Temporal   SpO2: 98%   Weight: 89 2 kg (196 lb 9 6 oz)   Height: 5' 10" (1 778 m)      Physical Exam  Constitutional:       Appearance: He is well-developed     HENT: Head: Normocephalic and atraumatic  Eyes:      Conjunctiva/sclera: Conjunctivae normal    Neck:      Musculoskeletal: Neck supple  Thyroid: No thyromegaly  Cardiovascular:      Rate and Rhythm: Normal rate and regular rhythm  Heart sounds: Normal heart sounds  No murmur  Pulmonary:      Effort: Pulmonary effort is normal  No respiratory distress  Breath sounds: Normal breath sounds  Lymphadenopathy:      Cervical: No cervical adenopathy  Skin:     General: Skin is warm and dry  Psychiatric:         Behavior: Behavior normal            Patient's chronic problems that were reviewed today are stable  Recent hospital stays reviewed  Recent labs and imaging reviewed  Recent visits to other providers reviewed  Meds reviewed and no changes made  Appropriate labs and imaging were ordered  Preventive measures appropriate for age and sex were reviewed with patient  Immunizations were updated as appropriate

## 2021-06-10 NOTE — PATIENT INSTRUCTIONS

## 2021-06-10 NOTE — PROGRESS NOTES
Assessment and Plan:     Problem List Items Addressed This Visit     None           Preventive health issues were discussed with patient, and age appropriate screening tests were ordered as noted in patient's After Visit Summary  Personalized health advice and appropriate referrals for health education or preventive services given if needed, as noted in patient's After Visit Summary       History of Present Illness:     Patient presents for Medicare Annual Wellness visit    Patient Care Team:  Alisson Walsh MD as PCP - General (Family Medicine)     Problem List:     Patient Active Problem List   Diagnosis    Low back pain    Elevated blood pressure reading    Gastroesophageal reflux disease without esophagitis    Low HDL (under 40)    Medicare annual wellness visit, subsequent    Bilateral hip pain    Bell's palsy      Past Medical and Surgical History:     Past Medical History:   Diagnosis Date    Chronic pain disorder     back    GERD (gastroesophageal reflux disease)     Seasonal allergies      Past Surgical History:   Procedure Laterality Date    COLONOSCOPY      KNEE ARTHROCENTESIS Right     LATERAL JOINT LINE    [place] ADJ TISS Zandra Mixer <10 SQCM Left 1/22/2019    Procedure: EXCISION OF BASAL CELL CA LOWER EYELID W/FROZEN SECTION, RECONSTRUCTION;  Surgeon: Collins Reeder MD;  Location: [address] [place] [address];  Service: Plastics      Family History:     Family History   Problem Relation Age of Onset    Heart disease Father         CARDIOVASCULAR    Stroke Father       Social History:     E-Cigarette/Vaping    E-Cigarette Use Never User      E-Cigarette/Vaping Substances    Nicotine No     THC No     CBD No     Flavoring No     Other No     Unknown No      Social History     Socioeconomic History    Marital status: /Civil Union     Spouse name: None    Number of children: None    Years of education: None    Highest education level: None   Occupational History    None   Social Needs  Financial resource strain: None    Food insecurity     Worry: None     Inability: None    Transportation needs     Medical: None     Non-medical: None   Tobacco Use    Smoking status: Never Smoker    Smokeless tobacco: Never Used    Tobacco comment: no exposure to passive smoke   Substance and Sexual Activity    Alcohol use: Not Currently     Comment: rarely    Drug use: No    Sexual activity: None   Lifestyle    Physical activity     Days per week: None     Minutes per session: None    Stress: None   Relationships    Social connections     Talks on phone: None     Gets together: None     Attends Worship service: None     Active member of club or organization: None     Attends meetings of clubs or organizations: None     Relationship status: None    Intimate partner violence     Fear of current or ex partner: None     Emotionally abused: None     Physically abused: None     Forced sexual activity: None   Other Topics Concern    None   Social History Narrative    None      Medications and Allergies:     Current Outpatient Medications   Medication Sig Dispense Refill    artificial tear (LUBRIFRESH P M ) 83-15 % ophthalmic ointment 2 (two) times a day      fluticasone (FLONASE) 50 mcg/act nasal spray 1 spray into each nostril daily 3 Bottle 3    ibuprofen (MOTRIN) 600 mg tablet take 1 tablet (600MG)  by oral route 3 times every day with food      omeprazole (PriLOSEC) 40 MG capsule TAKE 1 CAPSULE DAILY 90 capsule 3    predniSONE 10 mg tablet 5  tabs first day after the 5 days of 60 mg; 4tabs  the next day; 3 the next day; 2 the next day; one the last day  (Patient not taking: Reported on 3/15/2021) 15 tablet 0     No current facility-administered medications for this visit        No Known Allergies   Immunizations:     Immunization History   Administered Date(s) Administered    INFLUENZA 10/11/2016, 10/03/2017    Influenza Split High Dose Preservative Free IM 10/11/2016, 10/03/2017    Influenza, high dose seasonal 0 7 mL 10/03/2018, 10/11/2019, 10/09/2020    Influenza, seasonal, injectable 10/02/2015    Pneumococcal Conjugate 13-Valent 04/17/2015    Pneumococcal Polysaccharide PPV23 07/30/2013    SARS-CoV-2 / COVID-19 mRNA IM ([facility]) 02/14/2021, 03/31/2021    Tdap 03/29/2011    Zoster 11/06/2013      Health Maintenance:         Topic Date Due    Hepatitis C Screening  Never done    Colorectal Cancer Screening  07/03/2022         Topic Date Due    DTaP,Tdap,and Td Vaccines (2 - Td) 03/29/2021      Medicare Health Risk Assessment:     BP (!) 190/80 (BP Location: Left arm, Patient Position: Sitting, Cuff Size: Large)   Pulse 64   Temp 97 7 °F (36 5 °C) (Temporal)   Ht 5' 10" (1 778 m)   Wt 89 2 kg (196 lb 9 6 oz)   SpO2 98%   BMI 28 21 kg/m²      Majo Russ is here for his Subsequent Wellness visit  Health Risk Assessment:   Patient rates overall health as good  Patient feels that their physical health rating is same  Patient is satisfied with their life  Eyesight was rated as same  Hearing was rated as same  Patient feels that their emotional and mental health rating is same  Patients states they are sometimes angry  Patient states they are never, rarely unusually tired/fatigued  Pain experienced in the last 7 days has been some  Patient's pain rating has been 5/10  Patient states that he has experienced no weight loss or gain in last 6 months  Depression Screening:   PHQ-2 Score: 0      Fall Risk Screening: In the past year, patient has experienced: no history of falling in past year      Home Safety:  Patient does not have trouble with stairs inside or outside of their home  Patient has working smoke alarms and has working carbon monoxide detector  Home safety hazards include: none  Nutrition:   Current diet is Regular  Medications:   Patient is not currently taking any over-the-counter supplements  Patient is able to manage medications       Activities of Daily Living (ADLs)/Instrumental Activities of Daily Living (IADLs):   Walk and transfer into and out of bed and chair?: Yes  Dress and groom yourself?: Yes    Bathe or shower yourself?: Yes    Feed yourself? Yes  Do your laundry/housekeeping?: Yes  Manage your money, pay your bills and track your expenses?: Yes  Make your own meals?: Yes    Do your own shopping?: Yes    Previous Hospitalizations:   Any hospitalizations or ED visits within the last 12 months?: No      Advance Care Planning:   Living will: No      PREVENTIVE SCREENINGS      Cardiovascular Screening:    General: Screening Current      Colorectal Cancer Screening:     General: Screening Current      Prostate Cancer Screening:    General: Screening Not Indicated      Abdominal Aortic Aneurysm (AAA) Screening:    Risk factors include: age between 73-67 yo        Lung Cancer Screening:     General: Screening Not Indicated    Screening, Brief Intervention, and Referral to Treatment (SBIRT)    Screening  Typical number of drinks in a day: 0    Single Item Drug Screening:  How often have you used an illegal drug (including marijuana) or a prescription medication for non-medical reasons in the past year? never    Single Item Drug Screen Score: 0  Interpretation: Negative screen for possible drug use disorder      Charlette Montgomery MD BMI Counseling: Body mass index is 28 21 kg/m²  The BMI is above normal  Nutrition recommendations include reducing portion sizes, decreasing overall calorie intake, 3-5 servings of fruits/vegetables daily, reducing fast food intake, consuming healthier snacks, decreasing soda and/or juice intake, moderation in carbohydrate intake, increasing intake of lean protein, reducing intake of saturated fat and trans fat and reducing intake of cholesterol  Exercise recommendations include moderate aerobic physical activity for 150 minutes/week, exercising 3-5 times per week and joining a gym

## 2021-07-20 ENCOUNTER — RA CDI HCC (OUTPATIENT)
Dept: OTHER | Facility: HOSPITAL | Age: 76
End: 2021-07-20

## 2021-07-20 NOTE — PROGRESS NOTES
[facility] coding opportunities          Chart reviewed, no opportunity found: CHART REVIEWED, NO OPPORTUNITY FOUND                     Patients insurance company: 79 "[employer] [facility] (Medicare Advantage and Commercial)

## 2021-07-27 ENCOUNTER — OFFICE VISIT (OUTPATIENT)
Dept: FAMILY MEDICINE CLINIC | Facility: CLINIC | Age: 76
End: 2021-07-27
Payer: COMMERCIAL

## 2021-07-27 VITALS
TEMPERATURE: 98 F | HEART RATE: 58 BPM | WEIGHT: 192.6 LBS | OXYGEN SATURATION: 98 % | DIASTOLIC BLOOD PRESSURE: 82 MMHG | BODY MASS INDEX: 27.57 KG/M2 | HEIGHT: 70 IN | SYSTOLIC BLOOD PRESSURE: 152 MMHG

## 2021-07-27 DIAGNOSIS — G51.0 BELL'S PALSY: ICD-10-CM

## 2021-07-27 DIAGNOSIS — Z71.85 IMMUNIZATION COUNSELING: ICD-10-CM

## 2021-07-27 DIAGNOSIS — Z53.20 SCREENING FOR HEPATITIS C DECLINED: ICD-10-CM

## 2021-07-27 DIAGNOSIS — Z00.00 MEDICARE ANNUAL WELLNESS VISIT, SUBSEQUENT: Primary | ICD-10-CM

## 2021-07-27 LAB — HCV AB SER QL: NORMAL

## 2021-07-27 PROCEDURE — 1125F AMNT PAIN NOTED PAIN PRSNT: CPT | Performed by: FAMILY MEDICINE

## 2021-07-27 PROCEDURE — 86803 HEPATITIS C AB TEST: CPT | Performed by: FAMILY MEDICINE

## 2021-07-27 PROCEDURE — G0439 PPPS, SUBSEQ VISIT: HCPCS | Performed by: FAMILY MEDICINE

## 2021-07-27 PROCEDURE — 1101F PT FALLS ASSESS-DOCD LE1/YR: CPT | Performed by: FAMILY MEDICINE

## 2021-07-27 PROCEDURE — 99213 OFFICE O/P EST LOW 20 MIN: CPT | Performed by: FAMILY MEDICINE

## 2021-07-27 PROCEDURE — 3008F BODY MASS INDEX DOCD: CPT | Performed by: FAMILY MEDICINE

## 2021-07-27 PROCEDURE — 36415 COLL VENOUS BLD VENIPUNCTURE: CPT | Performed by: FAMILY MEDICINE

## 2021-07-27 PROCEDURE — 3725F SCREEN DEPRESSION PERFORMED: CPT | Performed by: FAMILY MEDICINE

## 2021-07-27 PROCEDURE — 1170F FXNL STATUS ASSESSED: CPT | Performed by: FAMILY MEDICINE

## 2021-07-27 PROCEDURE — 3288F FALL RISK ASSESSMENT DOCD: CPT | Performed by: FAMILY MEDICINE

## 2021-07-27 PROCEDURE — 1036F TOBACCO NON-USER: CPT | Performed by: FAMILY MEDICINE

## 2021-07-27 PROCEDURE — 1160F RVW MEDS BY RX/DR IN RCRD: CPT | Performed by: FAMILY MEDICINE

## 2021-07-27 NOTE — PROGRESS NOTES
Assessment/Plan:    No problem-specific Assessment & Plan notes found for this encounter  Diagnoses and all orders for this visit:    Medicare annual wellness visit, subsequent    Immunization counseling  Comments:  recommended tdap vaccine    Screening for hepatitis C declined  Comments:  patient declined    Bell's palsy    Other orders  -     Cancel: Hepatitis C Antibody ([facility]); Future          Subjective:      Patient ID: Berkeley Saint is a 77 y o  male  AWV/ sub     Champion f/u : continued improvement  Mild muscle weakness persisits  Several skin lesions : 2 actinics of face; one seb  Of elbow   The following portions of the patient's history were reviewed and updated as appropriate: allergies, current medications, past family history, past medical history, past social history, past surgical history and problem list     Review of Systems      Objective:  Vitals:    07/27/21 1339   BP: 152/82   BP Location: Left arm   Patient Position: Sitting   Cuff Size: Large   Pulse: 58   Temp: 98 °F (36 7 °C)   TempSrc: Temporal   SpO2: 98%   Weight: 87 4 kg (192 lb 9 6 oz)   Height: 5' 10" (1 778 m)      Physical Exam     Assessment and Plan:     Problem List Items Addressed This Visit        Nervous and Auditory    Bell's palsy       Other    Medicare annual wellness visit, subsequent - Primary (Chronic)    RESOLVED: Screening for hepatitis C declined      Other Visit Diagnoses     Immunization counseling        recommended tdap vaccine           Preventive health issues were discussed with patient, and age appropriate screening tests were ordered as noted in patient's After Visit Summary  Personalized health advice and appropriate referrals for health education or preventive services given if needed, as noted in patient's After Visit Summary       History of Present Illness:     Patient presents for Medicare Annual Wellness visit    Patient Care Team:  Adam Lorenzo MD as PCP - General (Family Medicine)     Problem List:     Patient Active Problem List   Diagnosis    Low back pain    Elevated blood pressure reading    Gastroesophageal reflux disease without esophagitis    Low HDL (under 40)    Medicare annual wellness visit, subsequent    Bilateral hip pain    Bell's palsy      Past Medical and Surgical History:     Past Medical History:   Diagnosis Date    Chronic pain disorder     back    GERD (gastroesophageal reflux disease)     Seasonal allergies      Past Surgical History:   Procedure Laterality Date    COLONOSCOPY      KNEE ARTHROCENTESIS Right     LATERAL JOINT LINE    [place] ADJ TISS Melanie Miners <10 SQCM Left 1/22/2019    Procedure: EXCISION OF BASAL CELL CA LOWER EYELID W/FROZEN SECTION, RECONSTRUCTION;  Surgeon: Sarai Alatorre MD;  Location: [address] [place] [address];  Service: Plastics      Family History:     Family History   Problem Relation Age of Onset    Heart disease Father         CARDIOVASCULAR    Stroke Father       Social History:     Social History     Socioeconomic History    Marital status: /Civil Union     Spouse name: None    Number of children: None    Years of education: None    Highest education level: None   Occupational History    None   Tobacco Use    Smoking status: Never Smoker    Smokeless tobacco: Never Used    Tobacco comment: no exposure to passive smoke   Vaping Use    Vaping Use: Never used   Substance and Sexual Activity    Alcohol use: Not Currently     Comment: rarely    Drug use: No    Sexual activity: None   Other Topics Concern    None   Social History Narrative    None     Social Determinants of Health     Financial Resource Strain:     Difficulty of Paying Living Expenses:    Food Insecurity:     Worried About Running Out of Food in the Last Year:     Ran Out of Food in the Last Year:    Transportation Needs:     Lack of Transportation (Medical):      Lack of Transportation (Non-Medical):    Physical Activity:     Days of Exercise per Week:     Minutes of Exercise per Session:    Stress:     Feeling of Stress :    Social Connections:     Frequency of Communication with Friends and Family:     Frequency of Social Gatherings with Friends and Family:     Attends Orthodox Services:     Active Member of Clubs or Organizations:     Attends Club or Organization Meetings:     Marital Status:    Intimate Partner Violence:     Fear of Current or Ex-Partner:     Emotionally Abused:     Physically Abused:     Sexually Abused:       Medications and Allergies:     Current Outpatient Medications   Medication Sig Dispense Refill    artificial tear (LUBRIFRESH P M ) 83-15 % ophthalmic ointment 2 (two) times a day      fluticasone (FLONASE) 50 mcg/act nasal spray 1 spray into each nostril daily 3 Bottle 3    ibuprofen (MOTRIN) 600 mg tablet take 1 tablet (600MG)  by oral route 3 times every day with food      omeprazole (PriLOSEC) 40 MG capsule TAKE 1 CAPSULE DAILY 90 capsule 3    predniSONE 10 mg tablet 5  tabs first day after the 5 days of 60 mg; 4tabs  the next day; 3 the next day; 2 the next day; one the last day  (Patient not taking: Reported on 3/15/2021) 15 tablet 0     No current facility-administered medications for this visit       No Known Allergies   Immunizations:     Immunization History   Administered Date(s) Administered    INFLUENZA 10/11/2016, 10/03/2017    Influenza Split High Dose Preservative Free IM 10/11/2016, 10/03/2017    Influenza, high dose seasonal 0 7 mL 10/03/2018, 10/11/2019, 10/09/2020    Influenza, seasonal, injectable 10/02/2015    Pneumococcal Conjugate 13-Valent 04/17/2015    Pneumococcal Polysaccharide PPV23 07/30/2013    SARS-CoV-2 / COVID-19 mRNA IM ([employer]) 02/14/2021, 03/31/2021    Tdap 03/29/2011    Zoster 11/06/2013      Health Maintenance:         Topic Date Due    Hepatitis C Screening  07/27/2022 (Originally 1945)    Colorectal Cancer Screening  07/03/2022 Topic Date Due    DTaP,Tdap,and Td Vaccines (2 - Td or Tdap) 03/29/2021    Influenza Vaccine (1) 09/01/2021      Medicare Health Risk Assessment:     /82 (BP Location: Left arm, Patient Position: Sitting, Cuff Size: Large)   Pulse 58   Temp 98 °F (36 7 °C) (Temporal)   Ht 5' 10" (1 778 m)   Wt 87 4 kg (192 lb 9 6 oz)   SpO2 98%   BMI 27 64 kg/m²      Alem Oseguera is here for his Subsequent Wellness visit  Last Medicare Wellness visit information reviewed, patient interviewed, no change since last AWV  Depression Screening:   PHQ-2 Score: 0      Previous Hospitalizations:   Any hospitalizations or ED visits within the last 12 months?: No      Advance Care Planning:   Living will: No    Durable POA for healthcare: No    Advanced directive: No      Comments: Papers given     Cognitive Screening:   Provider or family/friend/caregiver concerned regarding cognition?: No    PREVENTIVE SCREENINGS      Cardiovascular Screening:    General: Screening Current      Diabetes Screening:     General: Screening Current      Colorectal Cancer Screening:     General: Screening Current      Prostate Cancer Screening:    General: Screening Not Indicated      Osteoporosis Screening:    General: Screening Not Indicated      Abdominal Aortic Aneurysm (AAA) Screening:    Risk factors include: age between 73-69 yo        General: Screening Not Indicated      Lung Cancer Screening:     General: Screening Not Indicated      Hepatitis C Screening:    General: Screening Current    Screening, Brief Intervention, and Referral to Treatment (SBIRT)      Brief Intervention  Alcohol & drug use screenings were reviewed  No concerns regarding substance use disorder identified         Tori Lemus MD

## 2021-07-27 NOTE — PROGRESS NOTES
Assessment and Plan:     Problem List Items Addressed This Visit     None      Visit Diagnoses     Need for hepatitis C screening test    -  Primary           Preventive health issues were discussed with patient, and age appropriate screening tests were ordered as noted in patient's After Visit Summary  Personalized health advice and appropriate referrals for health education or preventive services given if needed, as noted in patient's After Visit Summary       History of Present Illness:     Patient presents for Medicare Annual Wellness visit    Patient Care Team:  Bg Kimbrough MD as PCP - General (Family Medicine)     Problem List:     Patient Active Problem List   Diagnosis    Low back pain    Elevated blood pressure reading    Gastroesophageal reflux disease without esophagitis    Low HDL (under 40)    Medicare annual wellness visit, subsequent    Bilateral hip pain    Bell's palsy      Past Medical and Surgical History:     Past Medical History:   Diagnosis Date    Chronic pain disorder     back    GERD (gastroesophageal reflux disease)     Seasonal allergies      Past Surgical History:   Procedure Laterality Date    COLONOSCOPY      KNEE ARTHROCENTESIS Right     LATERAL JOINT LINE    MN ADJ TISS Rawleigh Garbe <10 SQCM Left 1/22/2019    Procedure: EXCISION OF BASAL CELL CA LOWER EYELID W/FROZEN SECTION, RECONSTRUCTION;  Surgeon: Biju Beasley MD;  Location: [facility] MAIN OR;  Service: Plastics      Family History:     Family History   Problem Relation Age of Onset    Heart disease Father         CARDIOVASCULAR    Stroke Father       Social History:     Social History     Socioeconomic History    Marital status: /Civil Union     Spouse name: None    Number of children: None    Years of education: None    Highest education level: None   Occupational History    None   Tobacco Use    Smoking status: Never Smoker    Smokeless tobacco: Never Used    Tobacco comment: no exposure to passive smoke   Vaping Use    Vaping Use: Never used   Substance and Sexual Activity    Alcohol use: Not Currently     Comment: rarely    Drug use: No    Sexual activity: None   Other Topics Concern    None   Social History Narrative    None     Social Determinants of Health     Financial Resource Strain:     Difficulty of Paying Living Expenses:    Food Insecurity:     Worried About Running Out of Food in the Last Year:     Ran Out of Food in the Last Year:    Transportation Needs:     Lack of Transportation (Medical):  Lack of Transportation (Non-Medical):    Physical Activity:     Days of Exercise per Week:     Minutes of Exercise per Session:    Stress:     Feeling of Stress :    Social Connections:     Frequency of Communication with Friends and Family:     Frequency of Social Gatherings with Friends and Family:     Attends Yarsanism Services:     Active Member of Clubs or Organizations:     Attends Club or Organization Meetings:     Marital Status:    Intimate Partner Violence:     Fear of Current or Ex-Partner:     Emotionally Abused:     Physically Abused:     Sexually Abused:       Medications and Allergies:     Current Outpatient Medications   Medication Sig Dispense Refill    artificial tear (LUBRIFRESH P M ) 83-15 % ophthalmic ointment 2 (two) times a day      fluticasone (FLONASE) 50 mcg/act nasal spray 1 spray into each nostril daily 3 Bottle 3    ibuprofen (MOTRIN) 600 mg tablet take 1 tablet (600MG)  by oral route 3 times every day with food      omeprazole (PriLOSEC) 40 MG capsule TAKE 1 CAPSULE DAILY 90 capsule 3    predniSONE 10 mg tablet 5  tabs first day after the 5 days of 60 mg; 4tabs  the next day; 3 the next day; 2 the next day; one the last day  (Patient not taking: Reported on 3/15/2021) 15 tablet 0     No current facility-administered medications for this visit       No Known Allergies   Immunizations:     Immunization History   Administered Date(s) Administered    INFLUENZA 10/11/2016, 10/03/2017    Influenza Split High Dose Preservative Free IM 10/11/2016, 10/03/2017    Influenza, high dose seasonal 0 7 mL 10/03/2018, 10/11/2019, 10/09/2020    Influenza, seasonal, injectable 10/02/2015    Pneumococcal Conjugate 13-Valent 04/17/2015    Pneumococcal Polysaccharide PPV23 07/30/2013    SARS-CoV-2 / COVID-19 mRNA IM ([facility]) 02/14/2021, 03/31/2021    Tdap 03/29/2011    Zoster 11/06/2013      Health Maintenance:         Topic Date Due    Hepatitis C Screening  Never done    Colorectal Cancer Screening  07/03/2022         Topic Date Due    DTaP,Tdap,and Td Vaccines (2 - Td or Tdap) 03/29/2021    Influenza Vaccine (1) 09/01/2021      Medicare Health Risk Assessment:     /82 (BP Location: Left arm, Patient Position: Sitting, Cuff Size: Large)   Pulse 58   Temp 98 °F (36 7 °C) (Temporal)   Ht 5' 10" (1 778 m)   Wt 87 4 kg (192 lb 9 6 oz)   SpO2 98%   BMI 27 64 kg/m²      Dana Osborne is here for his Subsequent Wellness visit  Health Risk Assessment:   Patient rates overall health as good  Patient feels that their physical health rating is same  Patient is satisfied with their life  Eyesight was rated as slightly worse  Hearing was rated as slightly worse  Patient feels that their emotional and mental health rating is same  Patients states they are never, rarely angry  Patient states they are never, rarely unusually tired/fatigued  Pain experienced in the last 7 days has been none  Patient states that he has experienced no weight loss or gain in last 6 months  Depression Screening:   PHQ-2 Score: 0      Fall Risk Screening: In the past year, patient has experienced: no history of falling in past year      Home Safety:  Patient does not have trouble with stairs inside or outside of their home  Patient has working smoke alarms and has working carbon monoxide detector  Home safety hazards include: none       Nutrition:   Current diet is Regular  Medications:   Patient is currently taking over-the-counter supplements  OTC medications include: see medication list  Patient is able to manage medications  Activities of Daily Living (ADLs)/Instrumental Activities of Daily Living (IADLs):   Walk and transfer into and out of bed and chair?: Yes  Dress and groom yourself?: Yes    Bathe or shower yourself?: Yes    Feed yourself?  Yes  Do your laundry/housekeeping?: Yes  Manage your money, pay your bills and track your expenses?: Yes  Make your own meals?: Yes    Do your own shopping?: Yes    Previous Hospitalizations:   Any hospitalizations or ED visits within the last 12 months?: No      Advance Care Planning:   Living will: No      PREVENTIVE SCREENINGS      Cardiovascular Screening:    General: Screening Current      Colorectal Cancer Screening:     General: Screening Current      Prostate Cancer Screening:    General: Screening Not Indicated      Abdominal Aortic Aneurysm (AAA) Screening:    Risk factors include: age between 73-69 yo        Lung Cancer Screening:     General: Screening Not Indicated    Screening, Brief Intervention, and Referral to Treatment (SBIRT)    Screening  Typical number of drinks in a day: 0    Single Item Drug Screening:  How often have you used an illegal drug (including marijuana) or a prescription medication for non-medical reasons in the past year? never    Single Item Drug Screen Score: 0  Interpretation: Negative screen for possible drug use disorder      Marika Schwartz MD

## 2021-07-27 NOTE — PATIENT INSTRUCTIONS
Medicare Preventive Visit Patient Instructions  Thank you for completing your Welcome to Medicare Visit or Medicare Annual Wellness Visit today  Your next wellness visit will be due in one year (7/28/2022)  The screening/preventive services that you may require over the next 5-10 years are detailed below  Some tests may not apply to you based off risk factors and/or age  Screening tests ordered at today's visit but not completed yet may show as past due  Also, please note that scanned in results may not display below  Preventive Screenings:  Service Recommendations Previous Testing/Comments   Colorectal Cancer Screening  · Colonoscopy    · Fecal Occult Blood Test (FOBT)/Fecal Immunochemical Test (FIT)  · Fecal DNA/Cologuard Test  · Flexible Sigmoidoscopy Age: 54-65 years old   Colonoscopy: every 10 years (May be performed more frequently if at higher risk)  OR  FOBT/FIT: every 1 year  OR  Cologuard: every 3 years  OR  Sigmoidoscopy: every 5 years  Screening may be recommended earlier than age 48 if at higher risk for colorectal cancer  Also, an individualized decision between you and your healthcare provider will decide whether screening between the ages of 74-80 would be appropriate   Colonoscopy: 07/03/2012  FOBT/FIT: Not on file  Cologuard: Not on file  Sigmoidoscopy: Not on file    Screening Current     Prostate Cancer Screening Individualized decision between patient and health care provider in men between ages of 53-78   Medicare will cover every 12 months beginning on the day after your 50th birthday PSA: No results in last 5 years     Screening Not Indicated     Hepatitis C Screening Once for adults born between [birth year] and 1965  More frequently in patients at high risk for Hepatitis C Hep C Antibody: Not on file        Diabetes Screening 1-2 times per year if you're at risk for diabetes or have pre-diabetes Fasting glucose: No results in last 5 years   A1C: No results in last 5 years        Cholesterol Screening Once every 5 years if you don't have a lipid disorder  May order more often based on risk factors  Lipid panel: 04/05/2019    Screening Current      Other Preventive Screenings Covered by Medicare:  1  Abdominal Aortic Aneurysm (AAA) Screening: covered once if your at risk  You're considered to be at risk if you have a family history of AAA or a male between the age of 73-68 who smoking at least 100 cigarettes in your lifetime  2  Lung Cancer Screening: covers low dose CT scan once per year if you meet all of the following conditions: (1) Age 50-69; (2) No signs or symptoms of lung cancer; (3) Current smoker or have quit smoking within the last 15 years; (4) You have a tobacco smoking history of at least 30 pack years (packs per day x number of years you smoked); (5) You get a written order from a healthcare provider  3  Glaucoma Screening: covered annually if you're considered high risk: (1) You have diabetes OR (2) Family history of glaucoma OR (3)  aged 48 and older OR (3)  American aged 72 and older  3  Osteoporosis Screening: covered every 2 years if you meet one of the following conditions: (1) Have a vertebral abnormality; (2) On glucocorticoid therapy for more than 3 months; (3) Have primary hyperparathyroidism; (4) On osteoporosis medications and need to assess response to drug therapy  5  HIV Screening: covered annually if you're between the age of 12-76  Also covered annually if you are younger than 13 and older than 72 with risk factors for HIV infection  For pregnant patients, it is covered up to 3 times per pregnancy      Immunizations:  Immunization Recommendations   Influenza Vaccine Annual influenza vaccination during flu season is recommended for all persons aged >= 6 months who do not have contraindications   Pneumococcal Vaccine (Prevnar and Pneumovax)  * Prevnar = PCV13  * Pneumovax = PPSV23 Adults 25-60 years old: 1-3 doses may be recommended based on certain risk factors  Adults 72 years old: Prevnar (PCV13) vaccine recommended followed by Pneumovax (PPSV23) vaccine  If already received PPSV23 since turning 65, then PCV13 recommended at least one year after PPSV23 dose  Hepatitis B Vaccine 3 dose series if at intermediate or high risk (ex: diabetes, end stage renal disease, liver disease)   Tetanus (Td) Vaccine - COST NOT COVERED BY MEDICARE PART B Following completion of primary series, a booster dose should be given every 10 years to maintain immunity against tetanus  Td may also be given as tetanus wound prophylaxis  Tdap Vaccine - COST NOT COVERED BY MEDICARE PART B Recommended at least once for all adults  For pregnant patients, recommended with each pregnancy  Shingles Vaccine (Shingrix) - COST NOT COVERED BY MEDICARE PART B  2 shot series recommended in those aged 48 and above     Health Maintenance Due:      Topic Date Due    Hepatitis C Screening  07/27/2022 (Originally 1945)    Colorectal Cancer Screening  07/03/2022     Immunizations Due:      Topic Date Due    DTaP,Tdap,and Td Vaccines (2 - Td or Tdap) 03/29/2021    Influenza Vaccine (1) 09/01/2021     Advance Directives   What are advance directives? Advance directives are legal documents that state your wishes and plans for medical care  These plans are made ahead of time in case you lose your ability to make decisions for yourself  Advance directives can apply to any medical decision, such as the treatments you want, and if you want to donate organs  What are the types of advance directives? There are many types of advance directives, and each state has rules about how to use them  You may choose a combination of any of the following:  · Living will: This is a written record of the treatment you want  You can also choose which treatments you do not want, which to limit, and which to stop at a certain time  This includes surgery, medicine, IV fluid, and tube feedings     · Durable power of  for [facility]): This is a written record that states who you want to make healthcare choices for you when you are unable to make them for yourself  This person, called a proxy, is usually a family member or a friend  You may choose more than 1 proxy  · Do not resuscitate (DNR) order:  A DNR order is used in case your heart stops beating or you stop breathing  It is a request not to have certain forms of treatment, such as CPR  A DNR order may be included in other types of advance directives  · Medical directive: This covers the care that you want if you are in a coma, near death, or unable to make decisions for yourself  You can list the treatments you want for each condition  Treatment may include pain medicine, surgery, blood transfusions, dialysis, IV or tube feedings, and a ventilator (breathing machine)  · Values history: This document has questions about your views, beliefs, and how you feel and think about life  This information can help others choose the care that you would choose  Why are advance directives important? An advance directive helps you control your care  Although spoken wishes may be used, it is better to have your wishes written down  Spoken wishes can be misunderstood, or not followed  Treatments may be given even if you do not want them  An advance directive may make it easier for your family to make difficult choices about your care  Weight Management   Why it is important to manage your weight:  Being overweight increases your risk of health conditions such as heart disease, high blood pressure, type 2 diabetes, and certain types of cancer  It can also increase your risk for osteoarthritis, sleep apnea, and other respiratory problems  Aim for a slow, steady weight loss  Even a small amount of weight loss can lower your risk of health problems  How to lose weight safely:  A safe and healthy way to lose weight is to eat fewer calories and get regular exercise  You can lose up about 1 pound a week by decreasing the number of calories you eat by 500 calories each day  Healthy meal plan for weight management:  A healthy meal plan includes a variety of foods, contains fewer calories, and helps you stay healthy  A healthy meal plan includes the following:  · Eat whole-grain foods more often  A healthy meal plan should contain fiber  Fiber is the part of grains, fruits, and vegetables that is not broken down by your body  Whole-grain foods are healthy and provide extra fiber in your diet  Some examples of whole-grain foods are whole-wheat breads and pastas, oatmeal, brown rice, and bulgur  · Eat a variety of vegetables every day  Include dark, leafy greens such as spinach, kale, casper greens, and mustard greens  Eat yellow and orange vegetables such as carrots, sweet potatoes, and winter squash  · Eat a variety of fruits every day  Choose fresh or canned fruit (canned in its own juice or light syrup) instead of juice  Fruit juice has very little or no fiber  · Eat low-fat dairy foods  Drink fat-free (skim) milk or 1% milk  Eat fat-free yogurt and low-fat cottage cheese  Try low-fat cheeses such as mozzarella and other reduced-fat cheeses  · Choose meat and other protein foods that are low in fat  Choose beans or other legumes such as split peas or lentils  Choose fish, skinless poultry (chicken or turkey), or lean cuts of red meat (beef or pork)  Before you cook meat or poultry, cut off any visible fat  · Use less fat and oil  Try baking foods instead of frying them  Add less fat, such as margarine, sour cream, regular salad dressing and mayonnaise to foods  Eat fewer high-fat foods  Some examples of high-fat foods include french fries, doughnuts, ice cream, and cakes  · Eat fewer sweets  Limit foods and drinks that are high in sugar  This includes candy, cookies, regular soda, and sweetened drinks    Exercise:  Exercise at least 30 minutes per day on most days of the week  Some examples of exercise include walking, biking, dancing, and swimming  You can also fit in more physical activity by taking the stairs instead of the elevator or parking farther away from stores  Ask your healthcare provider about the best exercise plan for you  © Copyright [employer] 2018 Information is for End User's use only and may not be sold, redistributed or otherwise used for commercial purposes  All illustrations and images included in CareNotes® are the copyrighted property of [employer]  or [facility] [address]    Call Dr Harjeet Gganon and see when colon due   Check with the drugstore on repeating your tetanus shot  I recommended adult DT  As well the  new shingles vaccine is available  Medicare Preventive Visit Patient Instructions  Thank you for completing your Welcome to Medicare Visit or Medicare Annual Wellness Visit today  Your next wellness visit will be due in one year (7/28/2022)  The screening/preventive services that you may require over the next 5-10 years are detailed below  Some tests may not apply to you based off risk factors and/or age  Screening tests ordered at today's visit but not completed yet may show as past due  Also, please note that scanned in results may not display below  Preventive Screenings:  Service Recommendations Previous Testing/Comments   Colorectal Cancer Screening  · Colonoscopy    · Fecal Occult Blood Test (FOBT)/Fecal Immunochemical Test (FIT)  · Fecal DNA/Cologuard Test  · Flexible Sigmoidoscopy Age: 54-65 years old   Colonoscopy: every 10 years (May be performed more frequently if at higher risk)  OR  FOBT/FIT: every 1 year  OR  Cologuard: every 3 years  OR  Sigmoidoscopy: every 5 years  Screening may be recommended earlier than age 48 if at higher risk for colorectal cancer  Also, an individualized decision between you and your healthcare provider will decide whether screening between the ages of 74-80 would be appropriate  Colonoscopy: 07/03/2012  FOBT/FIT: Not on file  Cologuard: Not on file  Sigmoidoscopy: Not on file    Screening Current  Screening Current     Prostate Cancer Screening Individualized decision between patient and health care provider in men between ages of 53-78   Medicare will cover every 12 months beginning on the day after your 50th birthday PSA: No results in last 5 years     Screening Not Indicated  Screening Not Indicated     Hepatitis C Screening Once for adults born between [birth year] and 1965  More frequently in patients at high risk for Hepatitis C Hep C Antibody: Not on file    Screening Current   Diabetes Screening 1-2 times per year if you're at risk for diabetes or have pre-diabetes Fasting glucose: No results in last 5 years   A1C: No results in last 5 years    Screening Current   Cholesterol Screening Once every 5 years if you don't have a lipid disorder  May order more often based on risk factors  Lipid panel: 04/05/2019    Screening Current  Screening Current      Other Preventive Screenings Covered by Medicare:  6  Abdominal Aortic Aneurysm (AAA) Screening: covered once if your at risk  You're considered to be at risk if you have a family history of AAA or a male between the age of 73-68 who smoking at least 100 cigarettes in your lifetime  7  Lung Cancer Screening: covers low dose CT scan once per year if you meet all of the following conditions: (1) Age 50-69; (2) No signs or symptoms of lung cancer; (3) Current smoker or have quit smoking within the last 15 years; (4) You have a tobacco smoking history of at least 30 pack years (packs per day x number of years you smoked); (5) You get a written order from a healthcare provider  8  Glaucoma Screening: covered annually if you're considered high risk: (1) You have diabetes OR (2) Family history of glaucoma OR (3)  aged 48 and older OR (3)  American aged 72 and older  5   Osteoporosis Screening: covered every 2 years if you meet one of the following conditions: (1) Have a vertebral abnormality; (2) On glucocorticoid therapy for more than 3 months; (3) Have primary hyperparathyroidism; (4) On osteoporosis medications and need to assess response to drug therapy  10  HIV Screening: covered annually if you're between the age of 12-76  Also covered annually if you are younger than 13 and older than 72 with risk factors for HIV infection  For pregnant patients, it is covered up to 3 times per pregnancy  Immunizations:  Immunization Recommendations   Influenza Vaccine Annual influenza vaccination during flu season is recommended for all persons aged >= 6 months who do not have contraindications   Pneumococcal Vaccine (Prevnar and Pneumovax)  * Prevnar = PCV13  * Pneumovax = PPSV23 Adults 25-60 years old: 1-3 doses may be recommended based on certain risk factors  Adults 72 years old: Prevnar (PCV13) vaccine recommended followed by Pneumovax (PPSV23) vaccine  If already received PPSV23 since turning 65, then PCV13 recommended at least one year after PPSV23 dose  Hepatitis B Vaccine 3 dose series if at intermediate or high risk (ex: diabetes, end stage renal disease, liver disease)   Tetanus (Td) Vaccine - COST NOT COVERED BY MEDICARE PART B Following completion of primary series, a booster dose should be given every 10 years to maintain immunity against tetanus  Td may also be given as tetanus wound prophylaxis  Tdap Vaccine - COST NOT COVERED BY MEDICARE PART B Recommended at least once for all adults  For pregnant patients, recommended with each pregnancy     Shingles Vaccine (Shingrix) - COST NOT COVERED BY MEDICARE PART B  2 shot series recommended in those aged 48 and above     Health Maintenance Due:      Topic Date Due    Hepatitis C Screening  07/27/2022 (Originally 1945)    Colorectal Cancer Screening  07/03/2022     Immunizations Due:      Topic Date Due    DTaP,Tdap,and Td Vaccines (2 - Td or Tdap) 03/29/2021    Influenza Vaccine (1) 09/01/2021     Advance Directives   What are advance directives? Advance directives are legal documents that state your wishes and plans for medical care  These plans are made ahead of time in case you lose your ability to make decisions for yourself  Advance directives can apply to any medical decision, such as the treatments you want, and if you want to donate organs  What are the types of advance directives? There are many types of advance directives, and each state has rules about how to use them  You may choose a combination of any of the following:  · Living will: This is a written record of the treatment you want  You can also choose which treatments you do not want, which to limit, and which to stop at a certain time  This includes surgery, medicine, IV fluid, and tube feedings  · Durable power of  for healthcare [facility]): This is a written record that states who you want to make healthcare choices for you when you are unable to make them for yourself  This person, called a proxy, is usually a family member or a friend  You may choose more than 1 proxy  · Do not resuscitate (DNR) order:  A DNR order is used in case your heart stops beating or you stop breathing  It is a request not to have certain forms of treatment, such as CPR  A DNR order may be included in other types of advance directives  · Medical directive: This covers the care that you want if you are in a coma, near death, or unable to make decisions for yourself  You can list the treatments you want for each condition  Treatment may include pain medicine, surgery, blood transfusions, dialysis, IV or tube feedings, and a ventilator (breathing machine)  · Values history: This document has questions about your views, beliefs, and how you feel and think about life  This information can help others choose the care that you would choose  Why are advance directives important?   An advance directive helps you control your care  Although spoken wishes may be used, it is better to have your wishes written down  Spoken wishes can be misunderstood, or not followed  Treatments may be given even if you do not want them  An advance directive may make it easier for your family to make difficult choices about your care  Weight Management   Why it is important to manage your weight:  Being overweight increases your risk of health conditions such as heart disease, high blood pressure, type 2 diabetes, and certain types of cancer  It can also increase your risk for osteoarthritis, sleep apnea, and other respiratory problems  Aim for a slow, steady weight loss  Even a small amount of weight loss can lower your risk of health problems  How to lose weight safely:  A safe and healthy way to lose weight is to eat fewer calories and get regular exercise  You can lose up about 1 pound a week by decreasing the number of calories you eat by 500 calories each day  Healthy meal plan for weight management:  A healthy meal plan includes a variety of foods, contains fewer calories, and helps you stay healthy  A healthy meal plan includes the following:  · Eat whole-grain foods more often  A healthy meal plan should contain fiber  Fiber is the part of grains, fruits, and vegetables that is not broken down by your body  Whole-grain foods are healthy and provide extra fiber in your diet  Some examples of whole-grain foods are whole-wheat breads and pastas, oatmeal, brown rice, and bulgur  · Eat a variety of vegetables every day  Include dark, leafy greens such as spinach, kale, casper greens, and mustard greens  Eat yellow and orange vegetables such as carrots, sweet potatoes, and winter squash  · Eat a variety of fruits every day  Choose fresh or canned fruit (canned in its own juice or light syrup) instead of juice  Fruit juice has very little or no fiber  · Eat low-fat dairy foods  Drink fat-free (skim) milk or 1% milk   Eat fat-free yogurt and low-fat cottage cheese  Try low-fat cheeses such as mozzarella and other reduced-fat cheeses  · Choose meat and other protein foods that are low in fat  Choose beans or other legumes such as split peas or lentils  Choose fish, skinless poultry (chicken or turkey), or lean cuts of red meat (beef or pork)  Before you cook meat or poultry, cut off any visible fat  · Use less fat and oil  Try baking foods instead of frying them  Add less fat, such as margarine, sour cream, regular salad dressing and mayonnaise to foods  Eat fewer high-fat foods  Some examples of high-fat foods include french fries, doughnuts, ice cream, and cakes  · Eat fewer sweets  Limit foods and drinks that are high in sugar  This includes candy, cookies, regular soda, and sweetened drinks  Exercise:  Exercise at least 30 minutes per day on most days of the week  Some examples of exercise include walking, biking, dancing, and swimming  You can also fit in more physical activity by taking the stairs instead of the elevator or parking farther away from stores  Ask your healthcare provider about the best exercise plan for you  © Copyright [employer] 2018 Information is for End User's use only and may not be sold, redistributed or otherwise used for commercial purposes   All illustrations and images included in CareNotes® are the copyrighted property of [employer] Inc  or [address] [place] [address] [employer]

## 2021-08-18 ENCOUNTER — FOLLOW UP (OUTPATIENT)
Dept: URBAN - METROPOLITAN AREA CLINIC 6 | Facility: CLINIC | Age: 76
End: 2021-08-18

## 2021-08-18 DIAGNOSIS — H25.13: ICD-10-CM

## 2021-08-18 DIAGNOSIS — H16.142: ICD-10-CM

## 2021-08-18 DIAGNOSIS — G51.0: ICD-10-CM

## 2021-08-18 PROCEDURE — 92012 INTRM OPH EXAM EST PATIENT: CPT

## 2021-08-18 ASSESSMENT — TONOMETRY
OS_IOP_MMHG: 14
OD_IOP_MMHG: 13

## 2021-08-18 ASSESSMENT — VISUAL ACUITY
OS_CC: 20/20-2
OD_CC: 20/20-2

## 2021-09-27 DIAGNOSIS — M54.50 LOW BACK PAIN, UNSPECIFIED BACK PAIN LATERALITY, UNSPECIFIED CHRONICITY, UNSPECIFIED WHETHER SCIATICA PRESENT: Primary | ICD-10-CM

## 2021-09-27 RX ORDER — IBUPROFEN 600 MG/1
TABLET ORAL
Qty: 90 TABLET | Refills: 3 | Status: SHIPPED | OUTPATIENT
Start: 2021-09-27 | End: 2022-01-27 | Stop reason: SDUPTHER

## 2021-10-12 ENCOUNTER — VBI (OUTPATIENT)
Dept: ADMINISTRATIVE | Facility: OTHER | Age: 76
End: 2021-10-12

## 2021-10-14 ENCOUNTER — PROBLEM (OUTPATIENT)
Dept: URBAN - METROPOLITAN AREA CLINIC 6 | Facility: CLINIC | Age: 76
End: 2021-10-14

## 2021-10-14 DIAGNOSIS — G51.0: ICD-10-CM

## 2021-10-14 DIAGNOSIS — H04.123: ICD-10-CM

## 2021-10-14 PROCEDURE — 1036F TOBACCO NON-USER: CPT

## 2021-10-14 PROCEDURE — G8427 DOCREV CUR MEDS BY ELIG CLIN: HCPCS

## 2021-10-14 PROCEDURE — 92012 INTRM OPH EXAM EST PATIENT: CPT

## 2021-10-14 ASSESSMENT — TONOMETRY
OS_IOP_MMHG: 14
OD_IOP_MMHG: 17

## 2021-10-14 ASSESSMENT — VISUAL ACUITY
OD_CC: 20/20-1
OS_CC: 20/20-1

## 2021-10-18 ENCOUNTER — CLINICAL SUPPORT (OUTPATIENT)
Dept: FAMILY MEDICINE CLINIC | Facility: CLINIC | Age: 76
End: 2021-10-18
Payer: COMMERCIAL

## 2021-10-18 DIAGNOSIS — Z23 NEED FOR INFLUENZA VACCINATION: Primary | ICD-10-CM

## 2021-10-18 PROCEDURE — 90662 IIV NO PRSV INCREASED AG IM: CPT

## 2021-10-18 PROCEDURE — G0008 ADMIN INFLUENZA VIRUS VAC: HCPCS

## 2022-01-10 DIAGNOSIS — K21.9 GASTROESOPHAGEAL REFLUX DISEASE WITHOUT ESOPHAGITIS: ICD-10-CM

## 2022-01-10 RX ORDER — OMEPRAZOLE 40 MG/1
CAPSULE, DELAYED RELEASE ORAL
Qty: 90 CAPSULE | Refills: 3 | Status: SHIPPED | OUTPATIENT
Start: 2022-01-10

## 2022-01-20 ENCOUNTER — RA CDI HCC (OUTPATIENT)
Dept: OTHER | Facility: HOSPITAL | Age: 77
End: 2022-01-20

## 2022-01-20 NOTE — PROGRESS NOTES
[facility] 76  coding opportunities       Chart reviewed, no opportunity found: CHART REVIEWED, NO OPPORTUNITY FOUND                        Patients insurance company: [employer] (Medicare Advantage and Commercial)

## 2022-01-27 ENCOUNTER — OFFICE VISIT (OUTPATIENT)
Dept: FAMILY MEDICINE CLINIC | Facility: CLINIC | Age: 77
End: 2022-01-27
Payer: COMMERCIAL

## 2022-01-27 VITALS
SYSTOLIC BLOOD PRESSURE: 152 MMHG | HEART RATE: 49 BPM | OXYGEN SATURATION: 97 % | TEMPERATURE: 97.8 F | HEIGHT: 70 IN | DIASTOLIC BLOOD PRESSURE: 90 MMHG | BODY MASS INDEX: 27.86 KG/M2 | RESPIRATION RATE: 16 BRPM | WEIGHT: 194.6 LBS

## 2022-01-27 DIAGNOSIS — M54.50 LOW BACK PAIN, UNSPECIFIED BACK PAIN LATERALITY, UNSPECIFIED CHRONICITY, UNSPECIFIED WHETHER SCIATICA PRESENT: ICD-10-CM

## 2022-01-27 DIAGNOSIS — R03.0 ELEVATED BLOOD PRESSURE READING: Chronic | ICD-10-CM

## 2022-01-27 DIAGNOSIS — K21.9 GASTROESOPHAGEAL REFLUX DISEASE WITHOUT ESOPHAGITIS: Primary | ICD-10-CM

## 2022-01-27 DIAGNOSIS — M25.551 BILATERAL HIP PAIN: ICD-10-CM

## 2022-01-27 DIAGNOSIS — M25.552 BILATERAL HIP PAIN: ICD-10-CM

## 2022-01-27 PROCEDURE — 1036F TOBACCO NON-USER: CPT | Performed by: FAMILY MEDICINE

## 2022-01-27 PROCEDURE — 1160F RVW MEDS BY RX/DR IN RCRD: CPT | Performed by: FAMILY MEDICINE

## 2022-01-27 PROCEDURE — 99214 OFFICE O/P EST MOD 30 MIN: CPT | Performed by: FAMILY MEDICINE

## 2022-01-27 RX ORDER — IBUPROFEN 600 MG/1
TABLET ORAL
Qty: 90 TABLET | Refills: 3 | Status: SHIPPED | OUTPATIENT
Start: 2022-01-27 | End: 2022-01-27 | Stop reason: SDUPTHER

## 2022-01-27 RX ORDER — IBUPROFEN 600 MG/1
TABLET ORAL
Qty: 90 TABLET | Refills: 3 | Status: SHIPPED | OUTPATIENT
Start: 2022-01-27 | End: 2022-03-04 | Stop reason: SDUPTHER

## 2022-01-27 NOTE — PROGRESS NOTES
Assessment/Plan:    No problem-specific Assessment & Plan notes found for this encounter  Diagnoses and all orders for this visit:    Gastroesophageal reflux disease without esophagitis    Elevated blood pressure reading    Bilateral hip pain    Low back pain, unspecified back pain laterality, unspecified chronicity, unspecified whether sciatica present          Subjective:      Patient ID: Kranthi Brown is a 77 y o  male  PATIENT RETURNS FOR FOLLOW-UP OF CHRONIC MEDICAL CONDITIONS  ANY HOSPITAL VISITS, EMERGENCY VISITS AND OTHER PROVIDER VISITS SINCE LAST TIME WERE REVIEWED  MEDS WERE REVIEWED AND NO SIDE EFFECTS  NO NEW ISSUES  UNLESS NOTED BELOW  NO NEW MEDICAL PROVIDER REPORTED  THE CHRONIC DISEASES LISTED ABOVE ARE STABLE AND UNCHANGED/ THE PLAN OF CARE FOR THOSE WILL REMAIN UNCHANGED UNLESS NOTED BELOW  R parascapular pain       The following portions of the patient's history were reviewed and updated as appropriate: allergies, current medications, past family history, past medical history, past social history, past surgical history and problem list     Review of Systems   Constitutional: Negative for activity change and appetite change  HENT: Negative for trouble swallowing  Eyes: Negative for visual disturbance  Respiratory: Negative for cough and shortness of breath  Cardiovascular: Negative for chest pain, palpitations and leg swelling  Gastrointestinal: Negative for abdominal pain and blood in stool  Endocrine: Negative for polyuria  Genitourinary: Negative for difficulty urinating and hematuria  Musculoskeletal: Positive for arthralgias and back pain  Skin: Negative for rash  Neurological: Negative for dizziness  Psychiatric/Behavioral: Negative for behavioral problems           Objective:  Vitals:    01/27/22 1316   BP: 152/90   BP Location: Left arm   Patient Position: Sitting   Cuff Size: Large   Pulse: (!) 49   Resp: 16   Temp: 97 8 °F (36 6 °C)   TempSrc: Tympanic   SpO2: 97%   Weight: 88 3 kg (194 lb 9 6 oz)   Height: 5' 10" (1 778 m)      Physical Exam  Constitutional:       Appearance: He is well-developed  HENT:      Head: Normocephalic and atraumatic  Eyes:      Conjunctiva/sclera: Conjunctivae normal    Neck:      Thyroid: No thyromegaly  Cardiovascular:      Rate and Rhythm: Normal rate and regular rhythm  Heart sounds: Normal heart sounds  No murmur heard  Pulmonary:      Effort: Pulmonary effort is normal  No respiratory distress  Breath sounds: Normal breath sounds  Musculoskeletal:      Cervical back: Neck supple  Lymphadenopathy:      Cervical: No cervical adenopathy  Skin:     General: Skin is warm and dry  Psychiatric:         Behavior: Behavior normal          Patient's chronic problems that were reviewed today are stable  Recent hospital stays reviewed  Recent labs and imaging reviewed  Recent visits to other providers reviewed  Meds reviewed and no changes made  Appropriate labs and imaging were ordered  Preventive measures appropriate for age and sex were reviewed with patient  Immunizations were updated as appropriate

## 2022-01-27 NOTE — PATIENT INSTRUCTIONS
Try to modify her stretching routine as directed  You can try a topical product as well for several days  If all else fails you may use ibuprofen 3 times a day with meals for 5 days  Consider the N- 95 or K N  95 masks

## 2022-03-04 DIAGNOSIS — M54.50 LOW BACK PAIN, UNSPECIFIED BACK PAIN LATERALITY, UNSPECIFIED CHRONICITY, UNSPECIFIED WHETHER SCIATICA PRESENT: ICD-10-CM

## 2022-03-05 RX ORDER — IBUPROFEN 600 MG/1
TABLET ORAL
Qty: 90 TABLET | Refills: 3 | Status: SHIPPED | OUTPATIENT
Start: 2022-03-05 | End: 2022-03-07 | Stop reason: SDUPTHER

## 2022-03-07 DIAGNOSIS — M54.50 LOW BACK PAIN, UNSPECIFIED BACK PAIN LATERALITY, UNSPECIFIED CHRONICITY, UNSPECIFIED WHETHER SCIATICA PRESENT: ICD-10-CM

## 2022-03-07 RX ORDER — IBUPROFEN 600 MG/1
TABLET ORAL
Qty: 90 TABLET | Refills: 3 | Status: SHIPPED | OUTPATIENT
Start: 2022-03-07 | End: 2022-03-08 | Stop reason: SDUPTHER

## 2022-03-08 DIAGNOSIS — M54.50 LOW BACK PAIN, UNSPECIFIED BACK PAIN LATERALITY, UNSPECIFIED CHRONICITY, UNSPECIFIED WHETHER SCIATICA PRESENT: ICD-10-CM

## 2022-03-09 RX ORDER — IBUPROFEN 600 MG/1
TABLET ORAL
Qty: 270 TABLET | Refills: 3 | Status: SHIPPED | OUTPATIENT
Start: 2022-03-09

## 2022-06-29 ENCOUNTER — TELEPHONE (OUTPATIENT)
Dept: FAMILY MEDICINE CLINIC | Facility: CLINIC | Age: 77
End: 2022-06-29

## 2022-06-29 ENCOUNTER — TRANSCRIBE ORDERS (OUTPATIENT)
Dept: FAMILY MEDICINE CLINIC | Facility: CLINIC | Age: 77
End: 2022-06-29

## 2022-06-29 DIAGNOSIS — M54.50 LOW BACK PAIN, UNSPECIFIED BACK PAIN LATERALITY, UNSPECIFIED CHRONICITY, UNSPECIFIED WHETHER SCIATICA PRESENT: Primary | ICD-10-CM

## 2022-08-04 ENCOUNTER — OFFICE VISIT (OUTPATIENT)
Dept: FAMILY MEDICINE CLINIC | Facility: CLINIC | Age: 77
End: 2022-08-04
Payer: COMMERCIAL

## 2022-08-04 VITALS
DIASTOLIC BLOOD PRESSURE: 90 MMHG | SYSTOLIC BLOOD PRESSURE: 150 MMHG | HEIGHT: 70 IN | OXYGEN SATURATION: 98 % | HEART RATE: 51 BPM | WEIGHT: 190.6 LBS | BODY MASS INDEX: 27.29 KG/M2 | TEMPERATURE: 97.6 F

## 2022-08-04 DIAGNOSIS — G51.0 BELL'S PALSY: ICD-10-CM

## 2022-08-04 DIAGNOSIS — E78.6 LOW HDL (UNDER 40): Chronic | ICD-10-CM

## 2022-08-04 DIAGNOSIS — M54.50 LOW BACK PAIN, UNSPECIFIED BACK PAIN LATERALITY, UNSPECIFIED CHRONICITY, UNSPECIFIED WHETHER SCIATICA PRESENT: ICD-10-CM

## 2022-08-04 DIAGNOSIS — M25.552 BILATERAL HIP PAIN: ICD-10-CM

## 2022-08-04 DIAGNOSIS — Z00.00 MEDICARE ANNUAL WELLNESS VISIT, SUBSEQUENT: Chronic | ICD-10-CM

## 2022-08-04 DIAGNOSIS — R03.0 ELEVATED BLOOD PRESSURE READING: Chronic | ICD-10-CM

## 2022-08-04 DIAGNOSIS — M25.551 BILATERAL HIP PAIN: ICD-10-CM

## 2022-08-04 DIAGNOSIS — K21.9 GASTROESOPHAGEAL REFLUX DISEASE WITHOUT ESOPHAGITIS: Primary | ICD-10-CM

## 2022-08-04 PROCEDURE — 1125F AMNT PAIN NOTED PAIN PRSNT: CPT | Performed by: FAMILY MEDICINE

## 2022-08-04 PROCEDURE — 1160F RVW MEDS BY RX/DR IN RCRD: CPT | Performed by: FAMILY MEDICINE

## 2022-08-04 PROCEDURE — 3288F FALL RISK ASSESSMENT DOCD: CPT | Performed by: FAMILY MEDICINE

## 2022-08-04 PROCEDURE — 1170F FXNL STATUS ASSESSED: CPT | Performed by: FAMILY MEDICINE

## 2022-08-04 PROCEDURE — G0439 PPPS, SUBSEQ VISIT: HCPCS | Performed by: FAMILY MEDICINE

## 2022-08-04 PROCEDURE — 99214 OFFICE O/P EST MOD 30 MIN: CPT | Performed by: FAMILY MEDICINE

## 2022-08-04 PROCEDURE — 3725F SCREEN DEPRESSION PERFORMED: CPT | Performed by: FAMILY MEDICINE

## 2022-08-04 NOTE — PATIENT INSTRUCTIONS
Add 2 Tylenol Arthritis at bedtime or when you get up in middle of night  Ask your pharmacist to update her tetanus shot with a an adult DTaP  Medicare Preventive Visit Patient Instructions  Thank you for completing your Welcome to Medicare Visit or Medicare Annual Wellness Visit today  Your next wellness visit will be due in one year (8/5/2023)  The screening/preventive services that you may require over the next 5-10 years are detailed below  Some tests may not apply to you based off risk factors and/or age  Screening tests ordered at today's visit but not completed yet may show as past due  Also, please note that scanned in results may not display below  Preventive Screenings:  Service Recommendations Previous Testing/Comments   Colorectal Cancer Screening  · Colonoscopy    · Fecal Occult Blood Test (FOBT)/Fecal Immunochemical Test (FIT)  · Fecal DNA/Cologuard Test  · Flexible Sigmoidoscopy Age: 54-65 years old   Colonoscopy: every 10 years (May be performed more frequently if at higher risk)  OR  FOBT/FIT: every 1 year  OR  Cologuard: every 3 years  OR  Sigmoidoscopy: every 5 years  Screening may be recommended earlier than age 48 if at higher risk for colorectal cancer  Also, an individualized decision between you and your healthcare provider will decide whether screening between the ages of 74-80 would be appropriate   Colonoscopy: 07/03/2012  FOBT/FIT: Not on file  Cologuard: Not on file  Sigmoidoscopy: Not on file    Screening Current  Risks and Benefits Discussed  Due for Colonoscopy - Low Risk     Prostate Cancer Screening Individualized decision between patient and health care provider in men between ages of 53-78   Medicare will cover every 12 months beginning on the day after your 50th birthday PSA: No results in last 5 years     Screening Not Indicated  Screening Not Indicated     Hepatitis C Screening Once for adults born between [facility]  More frequently in patients at high risk for Hepatitis C Hep C Antibody: 07/27/2021    Screening Current  Screening Current   Diabetes Screening 1-2 times per year if you're at risk for diabetes or have pre-diabetes Fasting glucose: No results in last 5 years   A1C: No results in last 5 years    Screening Current  Screening Current   Cholesterol Screening Once every 5 years if you don't have a lipid disorder  May order more often based on risk factors  Lipid panel: 04/05/2019    Screening Current  Screening Current      Other Preventive Screenings Covered by Medicare:  1  Abdominal Aortic Aneurysm (AAA) Screening: covered once if your at risk  You're considered to be at risk if you have a family history of AAA or a male between the age of 73-68 who smoking at least 100 cigarettes in your lifetime  2  Lung Cancer Screening: covers low dose CT scan once per year if you meet all of the following conditions: (1) Age 50-69; (2) No signs or symptoms of lung cancer; (3) Current smoker or have quit smoking within the last 15 years; (4) You have a tobacco smoking history of at least 30 pack years (packs per day x number of years you smoked); (5) You get a written order from a healthcare provider  3  Glaucoma Screening: covered annually if you're considered high risk: (1) You have diabetes OR (2) Family history of glaucoma OR (3)  aged 48 and older OR (3)  American aged 72 and older  3  Osteoporosis Screening: covered every 2 years if you meet one of the following conditions: (1) Have a vertebral abnormality; (2) On glucocorticoid therapy for more than 3 months; (3) Have primary hyperparathyroidism; (4) On osteoporosis medications and need to assess response to drug therapy  5  HIV Screening: covered annually if you're between the age of 12-76  Also covered annually if you are younger than 13 and older than 72 with risk factors for HIV infection  For pregnant patients, it is covered up to 3 times per pregnancy      Immunizations:  Immunization Recommendations   Influenza Vaccine Annual influenza vaccination during flu season is recommended for all persons aged >= 6 months who do not have contraindications   Pneumococcal Vaccine (Prevnar and Pneumovax)  * Prevnar = PCV13  * Pneumovax = PPSV23 Adults 25-60 years old: 1-3 doses may be recommended based on certain risk factors  Adults 72 years old: Prevnar (PCV13) vaccine recommended followed by Pneumovax (PPSV23) vaccine  If already received PPSV23 since turning 65, then PCV13 recommended at least one year after PPSV23 dose  Hepatitis B Vaccine 3 dose series if at intermediate or high risk (ex: diabetes, end stage renal disease, liver disease)   Tetanus (Td) Vaccine - COST NOT COVERED BY MEDICARE PART B Following completion of primary series, a booster dose should be given every 10 years to maintain immunity against tetanus  Td may also be given as tetanus wound prophylaxis  Tdap Vaccine - COST NOT COVERED BY MEDICARE PART B Recommended at least once for all adults  For pregnant patients, recommended with each pregnancy  Shingles Vaccine (Shingrix) - COST NOT COVERED BY MEDICARE PART B  2 shot series recommended in those aged 48 and above     Health Maintenance Due:      Topic Date Due    Hepatitis C Screening  Completed     Immunizations Due:      Topic Date Due    Influenza Vaccine (1) 09/01/2022     Advance Directives   What are advance directives? Advance directives are legal documents that state your wishes and plans for medical care  These plans are made ahead of time in case you lose your ability to make decisions for yourself  Advance directives can apply to any medical decision, such as the treatments you want, and if you want to donate organs  What are the types of advance directives? There are many types of advance directives, and each state has rules about how to use them  You may choose a combination of any of the following:  · Living will:   This is a written record of the treatment you want  You can also choose which treatments you do not want, which to limit, and which to stop at a certain time  This includes surgery, medicine, IV fluid, and tube feedings  · Durable power of  for healthcare [facility]): This is a written record that states who you want to make healthcare choices for you when you are unable to make them for yourself  This person, called a proxy, is usually a family member or a friend  You may choose more than 1 proxy  · Do not resuscitate (DNR) order:  A DNR order is used in case your heart stops beating or you stop breathing  It is a request not to have certain forms of treatment, such as CPR  A DNR order may be included in other types of advance directives  · Medical directive: This covers the care that you want if you are in a coma, near death, or unable to make decisions for yourself  You can list the treatments you want for each condition  Treatment may include pain medicine, surgery, blood transfusions, dialysis, IV or tube feedings, and a ventilator (breathing machine)  · Values history: This document has questions about your views, beliefs, and how you feel and think about life  This information can help others choose the care that you would choose  Why are advance directives important? An advance directive helps you control your care  Although spoken wishes may be used, it is better to have your wishes written down  Spoken wishes can be misunderstood, or not followed  Treatments may be given even if you do not want them  An advance directive may make it easier for your family to make difficult choices about your care  Weight Management   Why it is important to manage your weight:  Being overweight increases your risk of health conditions such as heart disease, high blood pressure, type 2 diabetes, and certain types of cancer  It can also increase your risk for osteoarthritis, sleep apnea, and other respiratory problems   Aim for a slow, steady weight loss  Even a small amount of weight loss can lower your risk of health problems  How to lose weight safely:  A safe and healthy way to lose weight is to eat fewer calories and get regular exercise  You can lose up about 1 pound a week by decreasing the number of calories you eat by 500 calories each day  Healthy meal plan for weight management:  A healthy meal plan includes a variety of foods, contains fewer calories, and helps you stay healthy  A healthy meal plan includes the following:  · Eat whole-grain foods more often  A healthy meal plan should contain fiber  Fiber is the part of grains, fruits, and vegetables that is not broken down by your body  Whole-grain foods are healthy and provide extra fiber in your diet  Some examples of whole-grain foods are whole-wheat breads and pastas, oatmeal, brown rice, and bulgur  · Eat a variety of vegetables every day  Include dark, leafy greens such as spinach, kale, casper greens, and mustard greens  Eat yellow and orange vegetables such as carrots, sweet potatoes, and winter squash  · Eat a variety of fruits every day  Choose fresh or canned fruit (canned in its own juice or light syrup) instead of juice  Fruit juice has very little or no fiber  · Eat low-fat dairy foods  Drink fat-free (skim) milk or 1% milk  Eat fat-free yogurt and low-fat cottage cheese  Try low-fat cheeses such as mozzarella and other reduced-fat cheeses  · Choose meat and other protein foods that are low in fat  Choose beans or other legumes such as split peas or lentils  Choose fish, skinless poultry (chicken or turkey), or lean cuts of red meat (beef or pork)  Before you cook meat or poultry, cut off any visible fat  · Use less fat and oil  Try baking foods instead of frying them  Add less fat, such as margarine, sour cream, regular salad dressing and mayonnaise to foods  Eat fewer high-fat foods   Some examples of high-fat foods include french fries, doughnuts, ice cream, and cakes   · Eat fewer sweets  Limit foods and drinks that are high in sugar  This includes candy, cookies, regular soda, and sweetened drinks  Exercise:  Exercise at least 30 minutes per day on most days of the week  Some examples of exercise include walking, biking, dancing, and swimming  You can also fit in more physical activity by taking the stairs instead of the elevator or parking farther away from stores  Ask your healthcare provider about the best exercise plan for you  © Copyright [employer] 2018 Information is for End User's use only and may not be sold, redistributed or otherwise used for commercial purposes   All illustrations and images included in CareNotes® are the copyrighted property of [employer]  or [address] [place] [address]

## 2022-08-04 NOTE — PROGRESS NOTES
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)    Medicare annual wellness visit, subsequent (Chronic)    Bilateral hip pain           Preventive health issues were discussed with patient, and age appropriate screening tests were ordered as noted in patient's After Visit Summary  Personalized health advice and appropriate referrals for health education or preventive services given if needed, as noted in patient's After Visit Summary       History of Present Illness:     Patient presents for a Medicare Wellness Visit    HPI   Patient Care Team:  Sheri uGevara MD as PCP - General (Family Medicine)     Review of Systems:     Review of Systems     Problem List:     Patient Active Problem List   Diagnosis    Low back pain    Elevated blood pressure reading    Gastroesophageal reflux disease without esophagitis    Low HDL (under 40)    Medicare annual wellness visit, subsequent    Bilateral hip pain    Bell's palsy      Past Medical and Surgical History:     Past Medical History:   Diagnosis Date    Chronic pain disorder     back    GERD (gastroesophageal reflux disease)     Seasonal allergies      Past Surgical History:   Procedure Laterality Date    COLONOSCOPY      KNEE ARTHROCENTESIS Right     LATERAL JOINT LINE    CT ADJ TISS [place] <10 SQCM Left 1/22/2019    Procedure: EXCISION OF BASAL CELL CA LOWER EYELID W/FROZEN SECTION, RECONSTRUCTION;  Surgeon: Rick Martin MD;  Location: [address] [place] [address];  Service: Plastics      Family History:     Family History   Problem Relation Age of Onset    Heart disease Father         CARDIOVASCULAR    Stroke Father       Social History:     Social History     Socioeconomic History    Marital status: /Civil Union     Spouse name: None    Number of children: None    Years of education: None    Highest education level: None   Occupational History    None   Tobacco Use    Smoking status: Never Smoker    Smokeless tobacco: Never Used    Tobacco comment: no exposure to passive smoke   Vaping Use    Vaping Use: Never used   Substance and Sexual Activity    Alcohol use: Not Currently     Comment: rarely    Drug use: No    Sexual activity: None   Other Topics Concern    None   Social History Narrative    None     Social Determinants of Health     Financial Resource Strain: Not on file   Food Insecurity: Not on file   Transportation Needs: Not on file   Physical Activity: Not on file   Stress: Not on file   Social Connections: Not on file   Intimate Partner Violence: Not on file   Housing Stability: Not on file      Medications and Allergies:     Current Outpatient Medications   Medication Sig Dispense Refill    ibuprofen (MOTRIN) 600 mg tablet One tablet tid with food 270 tablet 3    omeprazole (PriLOSEC) 40 MG capsule TAKE 1 CAPSULE DAILY 90 capsule 3     No current facility-administered medications for this visit       No Known Allergies   Immunizations:     Immunization History   Administered Date(s) Administered    COVID-19 PFIZER VACCINE 0 3 ML IM 02/14/2021, 03/31/2021, 10/26/2021    COVID-19 Pfizer vac (Samuel-sucrose, gray cap) 12 yr+ IM 04/21/2022    INFLUENZA 10/11/2016, 10/03/2017    Influenza Split High Dose Preservative Free IM 10/11/2016, 10/03/2017    Influenza, high dose seasonal 0 7 mL 10/03/2018, 10/11/2019, 10/09/2020, 10/18/2021    Influenza, seasonal, injectable 10/02/2015    Pneumococcal Conjugate 13-Valent 04/17/2015    Pneumococcal Polysaccharide PPV23 07/30/2013    Tdap 03/29/2011    Zoster 11/06/2013      Health Maintenance:         Topic Date Due    Hepatitis C Screening  Completed         Topic Date Due    Influenza Vaccine (1) 09/01/2022      Medicare Screening Tests and Risk Assessments: Quiana Michele is here for his Subsequent Wellness visit  Last Medicare Wellness visit information reviewed, patient interviewed, no change since last AWV  Depression Screening:   PHQ-2 Score: 0      Previous Hospitalizations:   Any hospitalizations or ED visits within the last 12 months?: No      Hospitalization Comments: Eye check and dentaal OK     Advance Care Planning:   Living will: Yes    Durable POA for healthcare: Yes    Advanced directive: Yes      Cognitive Screening:   Provider or family/friend/caregiver concerned regarding cognition?: No    PREVENTIVE SCREENINGS      Cardiovascular Screening:    General: Screening Current      Diabetes Screening:     General: Screening Current      Colorectal Cancer Screening:     General: Risks and Benefits Discussed    Due for: Colonoscopy - Low Risk      Prostate Cancer Screening:    General: Screening Not Indicated      Osteoporosis Screening:    General: Screening Not Indicated      Abdominal Aortic Aneurysm (AAA) Screening:        General: Screening Not Indicated      Lung Cancer Screening:     General: Screening Not Indicated      Hepatitis C Screening:    General: Screening Current    Screening, Brief Intervention, and Referral to Treatment (SBIRT)    Screening      AUDIT-C Screenin) How often did you have a drink containing alcohol in the past year? never  2) How many drinks did you have on a typical day when you were drinking in the past year? 0  3) How often did you have 6 or more drinks on one occasion in the past year? never    AUDIT-C Score: 0  Interpretation: Score 0-3 (male): Negative screen for alcohol misuse    Brief Intervention  Alcohol & drug use screenings were reviewed  No concerns regarding substance use disorder identified       No exam data present     Physical Exam:     /90 (BP Location: Left arm, Patient Position: Sitting, Cuff Size: Large)   Pulse (!) 51   Temp 97 6 °F (36 4 °C) (Tympanic)   Ht 5' 10" (1 778 m)   Wt 86 5 kg (190 lb 9 6 oz)   SpO2 98%   BMI 27 35 kg/m²     Physical Exam     Jane Cooney MD

## 2022-08-04 NOTE — PROGRESS NOTES
Assessment and Plan:     Problem List Items Addressed This Visit    None          Preventive health issues were discussed with patient, and age appropriate screening tests were ordered as noted in patient's After Visit Summary  Personalized health advice and appropriate referrals for health education or preventive services given if needed, as noted in patient's After Visit Summary       History of Present Illness:     Patient presents for a Medicare Wellness Visit    HPI   Patient Care Team:  Wellington Paredes MD as PCP - General (Family Medicine)     Review of Systems:     Review of Systems     Problem List:     Patient Active Problem List   Diagnosis    Low back pain    Elevated blood pressure reading    Gastroesophageal reflux disease without esophagitis    Low HDL (under 40)    Medicare annual wellness visit, subsequent    Bilateral hip pain    Bell's palsy      Past Medical and Surgical History:     Past Medical History:   Diagnosis Date    Chronic pain disorder     back    GERD (gastroesophageal reflux disease)     Seasonal allergies      Past Surgical History:   Procedure Laterality Date    COLONOSCOPY      KNEE ARTHROCENTESIS Right     LATERAL JOINT LINE    MD ADJ TISS Milana Javid <10 SQCM Left 1/22/2019    Procedure: EXCISION OF BASAL CELL CA LOWER EYELID W/FROZEN SECTION, RECONSTRUCTION;  Surgeon: Tamir Sweet MD;  Location: [address] [place] [address];  Service: Plastics      Family History:     Family History   Problem Relation Age of Onset    Heart disease Father         CARDIOVASCULAR    Stroke Father       Social History:     Social History     Socioeconomic History    Marital status: /Civil Union     Spouse name: None    Number of children: None    Years of education: None    Highest education level: None   Occupational History    None   Tobacco Use    Smoking status: Never Smoker    Smokeless tobacco: Never Used    Tobacco comment: no exposure to passive smoke   Vaping Use    Vaping Use: Never used   Substance and Sexual Activity    Alcohol use: Not Currently     Comment: rarely    Drug use: No    Sexual activity: None   Other Topics Concern    None   Social History Narrative    None     Social Determinants of Health     Financial Resource Strain: Not on file   Food Insecurity: Not on file   Transportation Needs: Not on file   Physical Activity: Not on file   Stress: Not on file   Social Connections: Not on file   Intimate Partner Violence: Not on file   Housing Stability: Not on file      Medications and Allergies:     Current Outpatient Medications   Medication Sig Dispense Refill    ibuprofen (MOTRIN) 600 mg tablet One tablet tid with food 270 tablet 3    omeprazole (PriLOSEC) 40 MG capsule TAKE 1 CAPSULE DAILY 90 capsule 3     No current facility-administered medications for this visit  No Known Allergies   Immunizations:     Immunization History   Administered Date(s) Administered    COVID-19 PFIZER VACCINE 0 3 ML IM 02/14/2021, 03/31/2021, 10/26/2021    COVID-19 Pfizer vac (Samuel-sucrose, gray cap) 12 yr+ IM 04/21/2022    INFLUENZA 10/11/2016, 10/03/2017    Influenza Split High Dose Preservative Free IM 10/11/2016, 10/03/2017    Influenza, high dose seasonal 0 7 mL 10/03/2018, 10/11/2019, 10/09/2020, 10/18/2021    Influenza, seasonal, injectable 10/02/2015    Pneumococcal Conjugate 13-Valent 04/17/2015    Pneumococcal Polysaccharide PPV23 07/30/2013    Tdap 03/29/2011    Zoster 11/06/2013      Health Maintenance:         Topic Date Due    Hepatitis C Screening  Completed         Topic Date Due    Influenza Vaccine (1) 09/01/2022      Medicare Screening Tests and Risk Assessments:     Alma Umanzor is here for his Subsequent Wellness visit  Last Medicare Wellness visit information reviewed, patient interviewed and updates made to the record today  Health Risk Assessment:   Patient rates overall health as good   Patient feels that their physical health rating is same  Patient is satisfied with their life  Eyesight was rated as same  Hearing was rated as same  Patient feels that their emotional and mental health rating is same  Patients states they are never, rarely angry  Patient states they are never, rarely unusually tired/fatigued  Pain experienced in the last 7 days has been none  Patient states that he has experienced no weight loss or gain in last 6 months  Fall Risk Screening: In the past year, patient has experienced: no history of falling in past year      Home Safety:  Patient does not have trouble with stairs inside or outside of their home  Patient has working smoke alarms and has working carbon monoxide detector  Home safety hazards include: none  Nutrition:   Current diet is Regular  Medications:   Patient is currently taking over-the-counter supplements  OTC medications include: see medication list  Patient is able to manage medications  Activities of Daily Living (ADLs)/Instrumental Activities of Daily Living (IADLs):   Walk and transfer into and out of bed and chair?: Yes  Dress and groom yourself?: Yes    Bathe or shower yourself?: Yes    Feed yourself?  Yes  Do your laundry/housekeeping?: Yes  Manage your money, pay your bills and track your expenses?: Yes  Make your own meals?: Yes    Do your own shopping?: Yes    Previous Hospitalizations:   Any hospitalizations or ED visits within the last 12 months?: No      Advance Care Planning:   Living will: No    Durable POA for healthcare: No    Advanced directive: No      Comments: Papers given     Cognitive Screening:   Provider or family/friend/caregiver concerned regarding cognition?: No    PREVENTIVE SCREENINGS      Cardiovascular Screening:    General: Screening Current      Diabetes Screening:     General: Screening Current      Colorectal Cancer Screening:     General: Screening Current      Prostate Cancer Screening:    General: Screening Not Indicated      Osteoporosis Screening: General: Screening Not Indicated      Abdominal Aortic Aneurysm (AAA) Screening:    Risk factors include: age between 73-69 yo        General: Screening Not Indicated      Lung Cancer Screening:     General: Screening Not Indicated      Hepatitis C Screening:    General: Screening Current    Screening, Brief Intervention, and Referral to Treatment (SBIRT)    Screening  Typical number of drinks in a day: 0    AUDIT-C Screenin) How often did you have a drink containing alcohol in the past year? never  2) How many drinks did you have on a typical day when you were drinking in the past year? 0  3) How often did you have 6 or more drinks on one occasion in the past year? never    AUDIT-C Score: 0  Interpretation: Score 0-3 (male): Negative screen for alcohol misuse    Single Item Drug Screening:  How often have you used an illegal drug (including marijuana) or a prescription medication for non-medical reasons in the past year? never    Single Item Drug Screen Score: 0  Interpretation: Negative screen for possible drug use disorder    Brief Intervention  Alcohol & drug use screenings were reviewed  No concerns regarding substance use disorder identified       No exam data present     Physical Exam:     /90 (BP Location: Left arm, Patient Position: Sitting, Cuff Size: Large)   Pulse (!) 51   Temp 97 6 °F (36 4 °C) (Tympanic)   Ht 5' 10" (1 778 m)   Wt 86 5 kg (190 lb 9 6 oz)   SpO2 98%   BMI 27 35 kg/m²     Physical Exam     Vidhya Solitario MD

## 2022-08-04 NOTE — PROGRESS NOTES
Assessment/Plan:    No problem-specific Assessment & Plan notes found for this encounter  Diagnoses and all orders for this visit:    Gastroesophageal reflux disease without esophagitis    Bell's palsy    Bilateral hip pain    Low HDL (under 40)    Medicare annual wellness visit, subsequent    Elevated blood pressure reading          Subjective:      Patient ID: Parish Galeana is a 69 y o  male  PATIENT RETURNS FOR FOLLOW-UP OF CHRONIC MEDICAL CONDITIONS  ANY HOSPITAL VISITS, EMERGENCY VISITS AND OTHER PROVIDER VISITS SINCE LAST TIME WERE REVIEWED  MEDS WERE REVIEWED AND NO SIDE EFFECTS  NO NEW ISSUES  UNLESS NOTED BELOW  NO NEW MEDICAL PROVIDER REPORTED  THE CHRONIC DISEASES LISTED ABOVE ARE STABLE AND UNCHANGED/ THE PLAN OF CARE FOR THOSE WILL REMAIN UNCHANGED UNLESS NOTED BELOW  Awv/sub       The following portions of the patient's history were reviewed and updated as appropriate: allergies, current medications, past family history, past medical history, past social history, past surgical history and problem list     Review of Systems   Constitutional: Negative for activity change and appetite change  HENT: Negative for trouble swallowing  Eyes: Negative for visual disturbance  Respiratory: Negative for cough and shortness of breath  Cardiovascular: Negative for chest pain, palpitations and leg swelling  Gastrointestinal: Negative for abdominal pain and blood in stool  Endocrine: Negative for polyuria  Genitourinary: Negative for difficulty urinating and hematuria  Musculoskeletal: Positive for arthralgias and back pain  Skin: Negative for rash  Neurological: Negative for dizziness  Psychiatric/Behavioral: Negative for behavioral problems           Objective:  Vitals:    08/04/22 1416   BP: 150/90   BP Location: Left arm   Patient Position: Sitting   Cuff Size: Large   Pulse: (!) 51   Temp: 97 6 °F (36 4 °C)   TempSrc: Tympanic   SpO2: 98%   Weight: 86 5 kg (190 lb 9 6 oz) Height: 5' 10" (1 778 m)      Physical Exam  Constitutional:       Appearance: He is well-developed  HENT:      Head: Normocephalic and atraumatic  Eyes:      Conjunctiva/sclera: Conjunctivae normal    Neck:      Thyroid: No thyromegaly  Cardiovascular:      Rate and Rhythm: Normal rate and regular rhythm  Heart sounds: Normal heart sounds  No murmur heard  Pulmonary:      Effort: Pulmonary effort is normal  No respiratory distress  Breath sounds: Normal breath sounds  Musculoskeletal:      Cervical back: Neck supple  Lymphadenopathy:      Cervical: No cervical adenopathy  Skin:     General: Skin is warm and dry  Psychiatric:         Behavior: Behavior normal            Patient's chronic problems that were reviewed today are stable  Recent hospital stays reviewed  Recent labs and imaging reviewed  Recent visits to other providers reviewed  Meds reviewed and no changes made  Appropriate labs and imaging were ordered  Preventive measures appropriate for age and sex were reviewed with patient  Immunizations were updated as appropriate

## 2022-09-08 ENCOUNTER — VBI (OUTPATIENT)
Dept: ADMINISTRATIVE | Facility: OTHER | Age: 77
End: 2022-09-08

## 2022-09-14 ENCOUNTER — ESTABLISHED COMPREHENSIVE EXAM (OUTPATIENT)
Dept: URBAN - METROPOLITAN AREA CLINIC 6 | Facility: CLINIC | Age: 77
End: 2022-09-14

## 2022-09-14 DIAGNOSIS — H04.123: ICD-10-CM

## 2022-09-14 DIAGNOSIS — H25.13: ICD-10-CM

## 2022-09-14 PROCEDURE — 92014 COMPRE OPH EXAM EST PT 1/>: CPT

## 2022-09-14 ASSESSMENT — TONOMETRY
OD_IOP_MMHG: 12
OS_IOP_MMHG: 11

## 2022-09-14 ASSESSMENT — VISUAL ACUITY
OD_CC: 20/20+2
OU_CC: J1-2
OS_CC: 20/20-1

## 2022-10-10 ENCOUNTER — CLINICAL SUPPORT (OUTPATIENT)
Dept: FAMILY MEDICINE CLINIC | Facility: CLINIC | Age: 77
End: 2022-10-10
Payer: COMMERCIAL

## 2022-10-10 DIAGNOSIS — Z23 ENCOUNTER FOR IMMUNIZATION: Primary | ICD-10-CM

## 2022-10-10 PROCEDURE — G0008 ADMIN INFLUENZA VIRUS VAC: HCPCS | Performed by: FAMILY MEDICINE

## 2022-10-10 PROCEDURE — 90662 IIV NO PRSV INCREASED AG IM: CPT | Performed by: FAMILY MEDICINE

## 2022-10-20 ENCOUNTER — TELEPHONE (OUTPATIENT)
Dept: FAMILY MEDICINE CLINIC | Facility: CLINIC | Age: 77
End: 2022-10-20

## 2022-10-20 NOTE — TELEPHONE ENCOUNTER
Yunier Shields from [employer] called to informed patients B/P was high yesterday first reading was 182/108- 162/106  She wanted patient to go to the ER patient did not go  I don't see anything in the chart  Nurse would like for patient to be called and advise   Please advise

## 2022-12-27 NOTE — PROGRESS NOTES
Name: Stephanie Gaston      : 1945      MRN: [id number]  Encounter Provider: SABINE Galloway  Encounter Date: 2022   Encounter department: [address] [place] [address]  Xerosis of skin  Assessment & Plan:  Discussed trying fragrance-free/dye-free lotion such as Aquaphor, CeraVe, Cetaphil, Eucerin or Vaseline  · Emphasized regular emollient use in preventing eczema flares  Advised to take showers (Avoid hot water) limit duration to <10 minutes  Try to limit scratching to prevent breakdown      BMI Counseling: There is no height or weight on file to calculate BMI  The BMI is above normal  Nutrition recommendations include decreasing portion sizes, encouraging healthy choices of fruits and vegetables, decreasing fast food intake, consuming healthier snacks, limiting drinks that contain sugar, moderation in carbohydrate intake and reducing intake of saturated and trans fat  Exercise recommendations include exercising 3-5 times per week  No pharmacotherapy was ordered  Rationale for BMI follow-up plan is due to patient being overweight or obese  Subjective      Irritated skin on the back and sides of chest  Has been more frequent in the winter months  Wife has been applying hydrocortisone cream to the back with moderate relief but wanted to come get it checked out  Rash  This is a chronic problem  The current episode started 1 to 4 weeks ago  The problem has been gradually worsening since onset  The affected locations include the chest and back  The problem is moderate  The rash is characterized by itchiness  He was exposed to nothing  Pertinent negatives include no cough, fever, shortness of breath, sore throat or vomiting  Past treatments include topical steroids  The treatment provided no relief  There is no history of allergies, asthma, eczema or varicella  Review of Systems   Constitutional: Negative for chills and fever     HENT: Negative for ear pain and sore throat  Eyes: Negative for pain and visual disturbance  Respiratory: Negative for cough and shortness of breath  Cardiovascular: Negative for chest pain and palpitations  Gastrointestinal: Negative for abdominal pain and vomiting  Genitourinary: Negative for dysuria and hematuria  Musculoskeletal: Negative for arthralgias and back pain  Skin: Negative for color change and rash  Dry irritated skin     Neurological: Negative for seizures and syncope  Psychiatric/Behavioral: Negative for dysphoric mood  The patient is not nervous/anxious  All other systems reviewed and are negative  Current Outpatient Medications on File Prior to Visit   Medication Sig   • ibuprofen (MOTRIN) 600 mg tablet One tablet tid with food   • omeprazole (PriLOSEC) 40 MG capsule TAKE 1 CAPSULE DAILY       Objective     /80 (BP Location: Left arm, Patient Position: Sitting, Cuff Size: Large)   Pulse 81   Temp (!) 96 6 °F (35 9 °C) (Tympanic)   Ht 5' 10" (1 778 m)   Wt 87 kg (191 lb 12 8 oz)   SpO2 99%   BMI 27 52 kg/m²     Physical Exam  Vitals and nursing note reviewed  Constitutional:       Appearance: Normal appearance  He is normal weight  HENT:      Head: Normocephalic  Right Ear: Tympanic membrane, ear canal and external ear normal       Left Ear: Tympanic membrane, ear canal and external ear normal       Nose: Nose normal       Mouth/Throat:      Mouth: Mucous membranes are moist       Pharynx: Oropharynx is clear  Eyes:      Extraocular Movements: Extraocular movements intact  Conjunctiva/sclera: Conjunctivae normal       Pupils: Pupils are equal, round, and reactive to light  Cardiovascular:      Rate and Rhythm: Normal rate and regular rhythm  Pulses: Normal pulses  Heart sounds: Normal heart sounds  Pulmonary:      Effort: Pulmonary effort is normal       Breath sounds: Normal breath sounds  Abdominal:      General: Abdomen is flat   Bowel sounds are normal    Musculoskeletal:         General: Normal range of motion  Cervical back: Normal range of motion  Skin:     General: Skin is warm and dry  Capillary Refill: Capillary refill takes less than 2 seconds  Coloration: Skin is ashen  Findings: No abrasion, erythema or rash  Comments: Dry skin throughout back and chest  No areas of redness or breakdown  Neurological:      General: No focal deficit present  Mental Status: He is alert and oriented to person, place, and time  Mental status is at baseline  Psychiatric:         Mood and Affect: Mood normal          Behavior: Behavior normal          Thought Content: Thought content normal          Judgment: Judgment normal        Patient Instructions     Eczema   WHAT YOU NEED TO KNOW:   Eczema is an itchy, red skin rash  You are more likely to have it if your parent or a family member has eczema, asthma, or hay fever  Eczema is a long-term condition  You may have flare-ups from time to time for the rest of your life  DISCHARGE INSTRUCTIONS:   Return to the emergency department if:   · You develop a fever or have red streaks going up your arm or leg  · Your rash gets more swollen, red, or hot  Call your doctor if:   · Most of your skin is red, swollen, painful, and covered with scales  · You develop bloody, red, painful crusts  · Your skin blisters and oozes white or yellow pus  · You have questions or concerns about your condition or care  Medicines:   · Medicines may help reduce itching, redness, pain, and swelling  They may be given as a cream or pill  You may also receive antibiotics if you have a skin infection  · Take your medicine as directed  Contact your healthcare provider if you think your medicine is not helping or if you have side effects  Tell him or her if you are allergic to any medicine  Keep a list of the medicines, vitamins, and herbs you take   Include the amounts, and when and why you take them  Bring the list or the pill bottles to follow-up visits  Carry your medicine list with you in case of an emergency  Care for your skin:   · Do not scratch  Pat or press on your skin to relieve itching  Your symptoms will get worse if you scratch  Keep your fingernails short so you do not tear your skin if you do scratch  · Keep your skin moist   Rub lotion, cream, or ointment into your skin at least 2 times a day  Ask your healthcare provider what to use and how often to use it  · Take baths or showers  with warm water for 10 minutes or less  Use mild bar soap  Ask your healthcare provider for the best soap for you to use  · Wear cotton clothes  Wear loose-fitting clothes made from cotton or cotton blends  Avoid wool  · Use a humidifier  to add moisture to the air in your home  · Avoid changes in temperature , especially activities that cause you to sweat a lot  Sweat can cause itching  Remove blankets from your bed if you get hot while you sleep  · Avoid allergens, dust, and skin irritants  Do not use perfume, fabric softener, or makeup that burns or itches  Follow up with your doctor as directed:  Write down your questions so you remember to ask them during your visits  © Copyright [employer] 2022 Information is for End User's use only and may not be sold, redistributed or otherwise used for commercial purposes  All illustrations and images included in CareNotes® are the copyrighted property of [employer] D [employer] M [employer] Inc  or [employer]  The above information is an  only  It is not intended as medical advice for individual conditions or treatments  Talk to your doctor, nurse or pharmacist before following any medical regimen to see if it is safe and effective for you          SABINE Huitron

## 2022-12-28 ENCOUNTER — OFFICE VISIT (OUTPATIENT)
Dept: FAMILY MEDICINE CLINIC | Facility: CLINIC | Age: 77
End: 2022-12-28

## 2022-12-28 VITALS
BODY MASS INDEX: 27.46 KG/M2 | HEART RATE: 81 BPM | SYSTOLIC BLOOD PRESSURE: 142 MMHG | OXYGEN SATURATION: 99 % | DIASTOLIC BLOOD PRESSURE: 80 MMHG | TEMPERATURE: 96.6 F | WEIGHT: 191.8 LBS | HEIGHT: 70 IN

## 2022-12-28 DIAGNOSIS — L85.3 XEROSIS OF SKIN: Primary | ICD-10-CM

## 2022-12-28 PROBLEM — L30.9 ECZEMA: Status: ACTIVE | Noted: 2022-12-28

## 2022-12-28 NOTE — ASSESSMENT & PLAN NOTE
Discussed trying fragrance-free/dye-free lotion such as Aquaphor, CeraVe, Cetaphil, Eucerin or Vaseline  · Emphasized regular emollient use in preventing eczema flares  Advised to take showers (Avoid hot water) limit duration to <10 minutes  Try to limit scratching to prevent breakdown

## 2022-12-28 NOTE — PATIENT INSTRUCTIONS
Eczema   WHAT YOU NEED TO KNOW:   Eczema is an itchy, red skin rash  You are more likely to have it if your parent or a family member has eczema, asthma, or hay fever  Eczema is a long-term condition  You may have flare-ups from time to time for the rest of your life  DISCHARGE INSTRUCTIONS:   Return to the emergency department if:   You develop a fever or have red streaks going up your arm or leg  Your rash gets more swollen, red, or hot  Call your doctor if:   Most of your skin is red, swollen, painful, and covered with scales  You develop bloody, red, painful crusts  Your skin blisters and oozes white or yellow pus  You have questions or concerns about your condition or care  Medicines:   Medicines may help reduce itching, redness, pain, and swelling  They may be given as a cream or pill  You may also receive antibiotics if you have a skin infection  Take your medicine as directed  Contact your healthcare provider if you think your medicine is not helping or if you have side effects  Tell him or her if you are allergic to any medicine  Keep a list of the medicines, vitamins, and herbs you take  Include the amounts, and when and why you take them  Bring the list or the pill bottles to follow-up visits  Carry your medicine list with you in case of an emergency  Care for your skin:   Do not scratch  Pat or press on your skin to relieve itching  Your symptoms will get worse if you scratch  Keep your fingernails short so you do not tear your skin if you do scratch  Keep your skin moist   Rub lotion, cream, or ointment into your skin at least 2 times a day  Ask your healthcare provider what to use and how often to use it  Take baths or showers  with warm water for 10 minutes or less  Use mild bar soap  Ask your healthcare provider for the best soap for you to use  Wear cotton clothes  Wear loose-fitting clothes made from cotton or cotton blends  Avoid wool      Use a humidifier to add moisture to the air in your home  Avoid changes in temperature , especially activities that cause you to sweat a lot  Sweat can cause itching  Remove blankets from your bed if you get hot while you sleep  Avoid allergens, dust, and skin irritants  Do not use perfume, fabric softener, or makeup that burns or itches  Follow up with your doctor as directed:  Write down your questions so you remember to ask them during your visits  © Copyright [employer] 2022 Information is for End User's use only and may not be sold, redistributed or otherwise used for commercial purposes  All illustrations and images included in CareNotes® are the copyrighted property of A D A M , Inc  or [facility] Leandro Manzo   The above information is an  only  It is not intended as medical advice for individual conditions or treatments  Talk to your doctor, nurse or pharmacist before following any medical regimen to see if it is safe and effective for you

## 2023-01-03 DIAGNOSIS — K21.9 GASTROESOPHAGEAL REFLUX DISEASE WITHOUT ESOPHAGITIS: ICD-10-CM

## 2023-01-03 RX ORDER — OMEPRAZOLE 40 MG/1
CAPSULE, DELAYED RELEASE ORAL
Qty: 90 CAPSULE | Refills: 3 | Status: SHIPPED | OUTPATIENT
Start: 2023-01-03

## 2023-01-24 ENCOUNTER — TELEPHONE (OUTPATIENT)
Dept: FAMILY MEDICINE CLINIC | Facility: CLINIC | Age: 78
End: 2023-01-24

## 2023-01-24 NOTE — TELEPHONE ENCOUNTER
Patient arrived to GI center for colonoscopy  Spoke to [employer]  When vitals were assessed patients blood pressure was in the 200's (220/116) two repeat blood pressures were (221/115 & 205/97)  Heart rate has also been variable  When attached to the monitor he would range 110-120s with PAC's and would juan down into the 50's  He has been asymptomatic during this entire duration per provider  At this time it was decided to delay the colonoscopy and have the patient go to the emergency room for evaluation  Patient wants to go to a [facility]

## 2023-02-09 ENCOUNTER — OFFICE VISIT (OUTPATIENT)
Dept: FAMILY MEDICINE CLINIC | Facility: CLINIC | Age: 78
End: 2023-02-09

## 2023-02-09 VITALS
DIASTOLIC BLOOD PRESSURE: 96 MMHG | OXYGEN SATURATION: 97 % | HEIGHT: 70 IN | SYSTOLIC BLOOD PRESSURE: 174 MMHG | TEMPERATURE: 97.6 F | HEART RATE: 55 BPM | WEIGHT: 188.6 LBS | BODY MASS INDEX: 27 KG/M2

## 2023-02-09 DIAGNOSIS — K21.9 GASTROESOPHAGEAL REFLUX DISEASE WITHOUT ESOPHAGITIS: Primary | ICD-10-CM

## 2023-02-09 DIAGNOSIS — I47.1 ATRIAL TACHYCARDIA (HCC): ICD-10-CM

## 2023-02-09 DIAGNOSIS — I10 PRIMARY HYPERTENSION: Chronic | ICD-10-CM

## 2023-02-09 PROBLEM — I47.19 ATRIAL TACHYCARDIA: Status: ACTIVE | Noted: 2023-02-09

## 2023-02-09 RX ORDER — LOSARTAN POTASSIUM 25 MG/1
25 TABLET ORAL DAILY
Qty: 90 TABLET | Refills: 3 | Status: SHIPPED | OUTPATIENT
Start: 2023-02-09

## 2023-02-09 RX ORDER — POLYETHYLENE GLYCOL-3350 AND ELECTROLYTES 236; 6.74; 5.86; 2.97; 22.74 G/274.31G; G/274.31G; G/274.31G; G/274.31G; G/274.31G
POWDER, FOR SOLUTION ORAL
COMMUNITY
Start: 2023-01-19

## 2023-02-09 RX ORDER — HYDROCHLOROTHIAZIDE 12.5 MG/1
12.5 TABLET ORAL DAILY
COMMUNITY
Start: 2023-01-24 | End: 2023-02-23

## 2023-02-09 NOTE — PROGRESS NOTES
Assessment/Plan:    No problem-specific Assessment & Plan notes found for this encounter  Diagnoses and all orders for this visit:    Gastroesophageal reflux disease without esophagitis    Primary hypertension    Other orders  -     GaviLyte-G 236 g solution; TAKE 4000 ML BY MOUTH AS DIRECTED, FOLLOW INSTRUCTIONS PROVIDED BY OFFICE  (Patient not taking: Reported on 2/9/2023)  -     hydrochlorothiazide (HYDRODIURIL) 12 5 mg tablet; Take 12 5 mg by mouth daily (Patient not taking: Reported on 2/9/2023)          Subjective:      Patient ID: Cherylene Mikes is a 69 y o  male  PATIENT RETURNS FOR FOLLOW-UP OF CHRONIC MEDICAL CONDITIONS  ANY HOSPITAL VISITS, EMERGENCY VISITS AND OTHER PROVIDER VISITS SINCE LAST TIME WERE REVIEWED  MEDS WERE REVIEWED AND NO SIDE EFFECTS  NO NEW ISSUES  UNLESS NOTED BELOW  NO NEW MEDICAL PROVIDER REPORTED  THE CHRONIC DISEASES LISTED ABOVE ARE STABLE AND UNCHANGED/ THE PLAN OF CARE FOR THOSE WILL REMAIN UNCHANGED UNLESS NOTED BELOW  Patient was scheduled for a colonoscopy several weeks ago but his blood pressure was quite elevated and they sent him to the emergency room  Some med changes were made      The following portions of the patient's history were reviewed and updated as appropriate: allergies, current medications, past family history, past medical history, past social history, past surgical history and problem list     Review of Systems   Constitutional: Negative for activity change and appetite change  HENT: Negative for trouble swallowing  Eyes: Negative for visual disturbance  Respiratory: Negative for cough and shortness of breath  Cardiovascular: Negative for chest pain, palpitations and leg swelling  Gastrointestinal: Negative for abdominal pain and blood in stool  Endocrine: Negative for polyuria  Genitourinary: Negative for difficulty urinating and hematuria  Skin: Negative for rash  Neurological: Negative for dizziness  Psychiatric/Behavioral: Negative for behavioral problems  Objective:  Vitals:    02/09/23 1447   BP: (!) 174/96   BP Location: Left arm   Patient Position: Sitting   Cuff Size: Large   Pulse: 55   Temp: 97 6 °F (36 4 °C)   SpO2: 97%   Weight: 85 5 kg (188 lb 9 6 oz)   Height: 5' 10" (1 778 m)      Physical Exam  Constitutional:       Appearance: He is well-developed  HENT:      Head: Normocephalic and atraumatic  Eyes:      Conjunctiva/sclera: Conjunctivae normal    Neck:      Thyroid: No thyromegaly  Cardiovascular:      Rate and Rhythm: Normal rate and regular rhythm  Heart sounds: Normal heart sounds  No murmur heard  Pulmonary:      Effort: Pulmonary effort is normal  No respiratory distress  Breath sounds: Normal breath sounds  Musculoskeletal:      Cervical back: Neck supple  Lymphadenopathy:      Cervical: No cervical adenopathy  Skin:     General: Skin is warm and dry  Psychiatric:         Behavior: Behavior normal             Patient's chronic problems that were reviewed today are stable  Recent hospital stays reviewed  Recent labs and imaging reviewed  Recent visits to other providers reviewed  Meds reviewed and no changes made  Appropriate labs and imaging were ordered  Preventive measures appropriate for age and sex were reviewed with patient  Immunizations were updated as appropriate  Consistent elevation of blood pressure over the last several weeks    He was started on medications at the emergency room

## 2023-02-09 NOTE — PATIENT INSTRUCTIONS
PROPER BLOOD PRESSURE MEASUREMENTS INCLUDE:     SIT IN A STRAIGHT BACK CHAIR WITH YOUR FEET ON THE FLOOR   SIT AT LEAST 5 MINUTES BEFORE CHECKING BP   USE A CUFF THAT IS ABOVE THE ELBOW AND AUTOMATIC   "[employer]" IS A GOOD BRAND : [facility]/ [facility] HAVE THEM    RECORD BP 3-4 TIMES PER WEEK AND BRING TO NEXT OFFICE VISIT   DO HOME BP MEASUREMENTS AT LEAST 2 HRS AFTER ANY CAFFEINE DRINK     Target BP < 150/90 most times     Start taking the losartan once daily  Maximum benefit would be about 3 to 4 weeks  If you find your blood pressure is to target then we do not need to do anymore with medications presently  If the numbers are not to goal call me and we can do a little virtual visit to suggest changes in the dosing  Return in 6 to 8 weeks to see how the blood pressure is doing  At that point I would probably recommend repeating some blood work on the kidneys  Consider updating her tetanus shot with an adult DTaP  That can be done at your local pharmacy anytime

## 2023-03-03 LAB
CREAT ?TM UR-SCNC: 137.2 UMOL/L
EXT MICROALBUMIN URINE RANDOM: 8.9
MICROALBUMIN/CREAT UR: 64.9 MG/G{CREAT}

## 2023-03-08 PROBLEM — R80.9 POSITIVE FOR MICROALBUMINURIA: Chronic | Status: ACTIVE | Noted: 2023-03-08

## 2023-03-10 ENCOUNTER — TELEPHONE (OUTPATIENT)
Dept: FAMILY MEDICINE CLINIC | Facility: CLINIC | Age: 78
End: 2023-03-10

## 2023-03-12 ENCOUNTER — TRANSCRIBE ORDERS (OUTPATIENT)
Dept: FAMILY MEDICINE CLINIC | Facility: CLINIC | Age: 78
End: 2023-03-12

## 2023-03-12 DIAGNOSIS — M25.552 BILATERAL HIP PAIN: Primary | ICD-10-CM

## 2023-03-12 DIAGNOSIS — M25.551 BILATERAL HIP PAIN: Primary | ICD-10-CM

## 2023-03-12 DIAGNOSIS — M54.50 LOW BACK PAIN, UNSPECIFIED BACK PAIN LATERALITY, UNSPECIFIED CHRONICITY, UNSPECIFIED WHETHER SCIATICA PRESENT: ICD-10-CM

## 2023-03-17 ENCOUNTER — TELEPHONE (OUTPATIENT)
Dept: FAMILY MEDICINE CLINIC | Facility: CLINIC | Age: 78
End: 2023-03-17

## 2023-03-17 NOTE — TELEPHONE ENCOUNTER
Stress test yesterday canceled because BP too high  210/?     161/94 today   Dr Dax Romero told him to take  losartan 50mg    Doesn't want to do that before talking with Dr Oscar Wick not back in office until Monday

## 2023-03-19 ENCOUNTER — TRANSCRIBE ORDERS (OUTPATIENT)
Dept: FAMILY MEDICINE CLINIC | Facility: CLINIC | Age: 78
End: 2023-03-19

## 2023-03-19 DIAGNOSIS — I10 PRIMARY HYPERTENSION: Chronic | ICD-10-CM

## 2023-03-19 RX ORDER — LOSARTAN POTASSIUM 50 MG/1
50 TABLET ORAL DAILY
Qty: 90 TABLET | Refills: 3 | Status: SHIPPED | OUTPATIENT
Start: 2023-03-19

## 2023-03-19 NOTE — TELEPHONE ENCOUNTER
Message below with pcp recommendations where given to patient        Patient verbalized understanding

## 2023-04-06 ENCOUNTER — RA CDI HCC (OUTPATIENT)
Dept: OTHER | Facility: HOSPITAL | Age: 78
End: 2023-04-06

## 2023-04-06 NOTE — PROGRESS NOTES
[facility] [[email or web address]] [facility]  coding opportunities       Chart reviewed, no opportunity found:   [address]        Patients Insurance     Medicare Insurance: [employer]

## 2023-05-15 ENCOUNTER — TELEPHONE (OUTPATIENT)
Dept: FAMILY MEDICINE CLINIC | Facility: CLINIC | Age: 78
End: 2023-05-15

## 2023-05-15 NOTE — TELEPHONE ENCOUNTER
Patient has palsy in left side of face and is having muscle spasms in the eyelid, would like to know if this is concerning or if there is an excersise he should do please advise

## 2023-05-22 ENCOUNTER — TRANSCRIBE ORDERS (OUTPATIENT)
Dept: FAMILY MEDICINE CLINIC | Facility: CLINIC | Age: 78
End: 2023-05-22

## 2023-05-22 ENCOUNTER — TELEPHONE (OUTPATIENT)
Dept: FAMILY MEDICINE CLINIC | Facility: CLINIC | Age: 78
End: 2023-05-22

## 2023-05-22 DIAGNOSIS — I10 PRIMARY HYPERTENSION: Chronic | ICD-10-CM

## 2023-05-22 RX ORDER — LOSARTAN POTASSIUM 50 MG/1
50 TABLET ORAL DAILY
Qty: 90 TABLET | Refills: 3 | Status: CANCELLED | OUTPATIENT
Start: 2023-05-22

## 2023-05-22 RX ORDER — LOSARTAN POTASSIUM 50 MG/1
100 TABLET ORAL DAILY
Qty: 180 TABLET | Refills: 3 | Status: SHIPPED | OUTPATIENT
Start: 2023-05-22

## 2023-08-07 ENCOUNTER — RA CDI HCC (OUTPATIENT)
Dept: OTHER | Facility: HOSPITAL | Age: 78
End: 2023-08-07

## 2023-08-07 NOTE — PROGRESS NOTES
[address] [facility] coding opportunities       Chart reviewed, no opportunity found: [address]        Patients Insurance     Medicare Insurance: [employer]

## 2023-08-16 ENCOUNTER — OFFICE VISIT (OUTPATIENT)
Dept: FAMILY MEDICINE CLINIC | Facility: CLINIC | Age: 78
End: 2023-08-16
Payer: COMMERCIAL

## 2023-08-16 VITALS
SYSTOLIC BLOOD PRESSURE: 180 MMHG | BODY MASS INDEX: 26.12 KG/M2 | TEMPERATURE: 97.7 F | OXYGEN SATURATION: 98 % | WEIGHT: 186.6 LBS | DIASTOLIC BLOOD PRESSURE: 100 MMHG | HEIGHT: 71 IN | HEART RATE: 63 BPM

## 2023-08-16 DIAGNOSIS — R80.9 POSITIVE FOR MICROALBUMINURIA: Chronic | ICD-10-CM

## 2023-08-16 DIAGNOSIS — Z00.00 MEDICARE ANNUAL WELLNESS VISIT, SUBSEQUENT: Chronic | ICD-10-CM

## 2023-08-16 DIAGNOSIS — M54.50 LOW BACK PAIN, UNSPECIFIED BACK PAIN LATERALITY, UNSPECIFIED CHRONICITY, UNSPECIFIED WHETHER SCIATICA PRESENT: ICD-10-CM

## 2023-08-16 DIAGNOSIS — I10 PRIMARY HYPERTENSION: Chronic | ICD-10-CM

## 2023-08-16 DIAGNOSIS — G51.0 BELL'S PALSY: ICD-10-CM

## 2023-08-16 DIAGNOSIS — K21.9 GASTROESOPHAGEAL REFLUX DISEASE WITHOUT ESOPHAGITIS: Primary | ICD-10-CM

## 2023-08-16 DIAGNOSIS — I47.1 ATRIAL TACHYCARDIA (HCC): ICD-10-CM

## 2023-08-16 DIAGNOSIS — E78.6 LOW HDL (UNDER 40): Chronic | ICD-10-CM

## 2023-08-16 PROCEDURE — G0444 DEPRESSION SCREEN ANNUAL: HCPCS | Performed by: FAMILY MEDICINE

## 2023-08-16 PROCEDURE — G0439 PPPS, SUBSEQ VISIT: HCPCS | Performed by: FAMILY MEDICINE

## 2023-08-16 PROCEDURE — 99214 OFFICE O/P EST MOD 30 MIN: CPT | Performed by: FAMILY MEDICINE

## 2023-08-16 RX ORDER — LOSARTAN POTASSIUM 50 MG/1
100 TABLET ORAL DAILY
Qty: 180 TABLET | Refills: 3 | Status: SHIPPED | OUTPATIENT
Start: 2023-08-16

## 2023-08-16 NOTE — PROGRESS NOTES
Assessment and Plan:     Problem List Items Addressed This Visit        Digestive    Gastroesophageal reflux disease without esophagitis - Primary       Cardiovascular and Mediastinum    Primary hypertension (Chronic)     To goal at home          Atrial tachycardia (HCC)       Nervous and Auditory    Bell's palsy       Other    Low HDL (under 40) (Chronic)    Medicare annual wellness visit, subsequent (Chronic)    Positive for microalbuminuria (Chronic)    Low back pain        Preventive health issues were discussed with patient, and age appropriate screening tests were ordered as noted in patient's After Visit Summary. Personalized health advice and appropriate referrals for health education or preventive services given if needed, as noted in patient's After Visit Summary.      History of Present Illness:     Patient presents for a Medicare Wellness Visit    HPI   Patient Care Team:  Urbano Montero MD as PCP - General (Family Medicine)     Review of Systems:     Review of Systems     Problem List:     Patient Active Problem List   Diagnosis   • Low back pain   • Primary hypertension   • Gastroesophageal reflux disease without esophagitis   • Low HDL (under 40)   • Medicare annual wellness visit, subsequent   • Bilateral hip pain   • Bell's palsy   • Xerosis of skin   • Atrial tachycardia (HCC)   • Positive for microalbuminuria      Past Medical and Surgical History:     Past Medical History:   Diagnosis Date   • Chronic pain disorder     back   • GERD (gastroesophageal reflux disease)    • Seasonal allergies      Past Surgical History:   Procedure Laterality Date   • COLONOSCOPY     • KNEE ARTHROCENTESIS Right     LATERAL JOINT LINE   • [place] ADJT TIS TRNSFR/REARRGMT E/N/E/L DFCT 10 SQ CM/< Left 1/22/2019    Procedure: EXCISION OF BASAL CELL CA LOWER EYELID W/FROZEN SECTION, RECONSTRUCTION;  Surgeon: Shanika Kearns MD;  Location: [address] [place] [address];  Service: Plastics      Family History:     Family History   Problem Relation Age of Onset   • Heart disease Father         CARDIOVASCULAR   • Stroke Father       Social History:     Social History     Socioeconomic History   • Marital status: /Civil Union     Spouse name: None   • Number of children: None   • Years of education: None   • Highest education level: None   Occupational History   • None   Tobacco Use   • Smoking status: Never   • Smokeless tobacco: Never   • Tobacco comments:     no exposure to passive smoke   Vaping Use   • Vaping Use: Never used   Substance and Sexual Activity   • Alcohol use: Not Currently     Comment: rarely   • Drug use: No   • Sexual activity: None   Other Topics Concern   • None   Social History Narrative   • None     Social Determinants of Health     Financial Resource Strain: Not on file   Food Insecurity: Not on file   Transportation Needs: Not on file   Physical Activity: Not on file   Stress: Not on file   Social Connections: Not on file   Intimate Partner Violence: Not on file   Housing Stability: Not on file      Medications and Allergies:     Current Outpatient Medications   Medication Sig Dispense Refill   • losartan (COZAAR) 50 mg tablet Take 2 tablets (100 mg total) by mouth daily 180 tablet 3   • omeprazole (PriLOSEC) 40 MG capsule TAKE 1 CAPSULE DAILY 90 capsule 3     No current facility-administered medications for this visit.      No Known Allergies   Immunizations:     Immunization History   Administered Date(s) Administered   • COVID-19 PFIZER VACCINE 0.3 ML IM 02/14/2021, 03/31/2021, 10/26/2021   • COVID-19 Pfizer vac (Samuel-sucrose, gray cap) 12 yr+ IM 04/21/2022   • INFLUENZA 10/11/2016, 10/03/2017, 10/10/2022   • Influenza Split High Dose Preservative Free IM 10/11/2016, 10/03/2017   • Influenza, high dose seasonal 0.7 mL 10/03/2018, 10/11/2019, 10/09/2020, 10/18/2021, 10/10/2022   • Influenza, seasonal, injectable 10/02/2015   • Pneumococcal Conjugate 13-Valent 04/17/2015   • Pneumococcal Polysaccharide PPV23 07/30/2013 • Tdap 03/29/2011   • Zoster 11/06/2013      Health Maintenance:         Topic Date Due   • Hepatitis C Screening  Completed   • Colorectal Cancer Screening  Discontinued         Topic Date Due   • COVID-19 Vaccine (5 - Pfizer series) 06/16/2022   • Influenza Vaccine (1) 09/01/2023      Medicare Screening Tests and Risk Assessments:     Nahomy Mcdonough is here for his Subsequent Wellness visit. Last Medicare Wellness visit information reviewed, patient interviewed, no change since last AWV. Depression Screening:   PHQ-2 Score: 0      Previous Hospitalizations:   Any hospitalizations or ED visits within the last 12 months?: No      Hospitalization Comments: Eyes/ dental OK     Advance Care Planning:   Living will: Yes    Durable POA for healthcare: Yes    Advanced directive: Yes      Cognitive Screening:   Provider or family/friend/caregiver concerned regarding cognition?: No    PREVENTIVE SCREENINGS      Cardiovascular Screening:    General: Screening Current      Colorectal Cancer Screening:     General: Screening Current      Prostate Cancer Screening:    General: Screening Not Indicated      Osteoporosis Screening:    General: Screening Not Indicated      Abdominal Aortic Aneurysm (AAA) Screening:        General: Screening Not Indicated      Lung Cancer Screening:     General: Screening Not Indicated      Hepatitis C Screening:    General: Screening Current    Screening, Brief Intervention, and Referral to Treatment (SBIRT)      Brief Intervention  Alcohol & drug use screenings were reviewed. No concerns regarding substance use disorder identified. No results found.      Physical Exam:     BP (!) 180/100 (BP Location: Left arm, Patient Position: Sitting, Cuff Size: Adult)   Pulse 63   Temp 97.7 °F (36.5 °C) (Temporal)   Ht 5' 10.5" (1.791 m)   Wt 84.6 kg (186 lb 9.6 oz)   SpO2 98%   BMI 26.40 kg/m²     Physical Exam     Harlon Bamberger, MD

## 2023-08-16 NOTE — PROGRESS NOTES
Assessment/Plan:    No problem-specific Assessment & Plan notes found for this encounter. There are no diagnoses linked to this encounter. Subjective:      Patient ID: Katerin Coats is a 69 y.o. male. PATIENT RETURNS FOR FOLLOW-UP OF CHRONIC MEDICAL CONDITIONS. ANY HOSPITAL VISITS, EMERGENCY VISITS AND OTHER PROVIDER VISITS SINCE LAST TIME WERE REVIEWED. MEDS WERE REVIEWED AND NO SIDE EFFECTS. NO NEW ISSUES  UNLESS NOTED BELOW. NO NEW MEDICAL PROVIDER REPORTED. THE CHRONIC DISEASES LISTED ABOVE ARE STABLE AND UNCHANGED/ THE PLAN OF CARE FOR THOSE WILL REMAIN UNCHANGED UNLESS NOTED BELOW. AWV/sub       The following portions of the patient's history were reviewed and updated as appropriate: allergies, current medications, past family history, past medical history, past social history, past surgical history and problem list.    Review of Systems   Constitutional: Negative for activity change and appetite change. HENT: Negative for trouble swallowing. Eyes: Negative for visual disturbance. Respiratory: Negative for cough and shortness of breath. Cardiovascular: Negative for chest pain, palpitations and leg swelling. Gastrointestinal: Negative for abdominal pain and blood in stool. Endocrine: Negative for polyuria. Genitourinary: Negative for difficulty urinating and hematuria. Skin: Negative for rash. Neurological: Negative for dizziness. Psychiatric/Behavioral: Negative for behavioral problems. Objective: There were no vitals filed for this visit. Physical Exam  Constitutional:       Appearance: He is well-developed. HENT:      Head: Normocephalic and atraumatic. Eyes:      Conjunctiva/sclera: Conjunctivae normal.   Neck:      Thyroid: No thyromegaly. Cardiovascular:      Rate and Rhythm: Normal rate and regular rhythm. Heart sounds: Normal heart sounds. No murmur heard. Pulmonary:      Effort: Pulmonary effort is normal. No respiratory distress. Breath sounds: Normal breath sounds. Musculoskeletal:      Cervical back: Neck supple. Lymphadenopathy:      Cervical: No cervical adenopathy. Skin:     General: Skin is warm and dry. Psychiatric:         Behavior: Behavior normal.           Patient's chronic problems that were reviewed today are stable. Recent hospital stays reviewed. Recent labs and imaging reviewed. Recent visits to other providers reviewed. Meds reviewed and no changes made. Appropriate labs and imaging were ordered. Preventive measures appropriate for age and sex were reviewed with patient. Immunizations were updated as appropriate.

## 2023-08-16 NOTE — PROGRESS NOTES
Assessment and Plan:     Problem List Items Addressed This Visit    None       Preventive health issues were discussed with patient, and age appropriate screening tests were ordered as noted in patient's After Visit Summary. Personalized health advice and appropriate referrals for health education or preventive services given if needed, as noted in patient's After Visit Summary.      History of Present Illness:     Patient presents for a Medicare Wellness Visit    HPI   Patient Care Team:  Pito Langford MD as PCP - General (Family Medicine)     Review of Systems:     Review of Systems     Problem List:     Patient Active Problem List   Diagnosis   • Low back pain   • Primary hypertension   • Gastroesophageal reflux disease without esophagitis   • Low HDL (under 40)   • Medicare annual wellness visit, subsequent   • Bilateral hip pain   • Bell's palsy   • Xerosis of skin   • Atrial tachycardia (HCC)   • Positive for microalbuminuria      Past Medical and Surgical History:     Past Medical History:   Diagnosis Date   • Chronic pain disorder     back   • GERD (gastroesophageal reflux disease)    • Seasonal allergies      Past Surgical History:   Procedure Laterality Date   • COLONOSCOPY     • KNEE ARTHROCENTESIS Right     LATERAL JOINT LINE   • TX ADJT TIS TRNSFR/REARRGMT E/N/E/L DFCT 10 SQ CM/< Left 1/22/2019    Procedure: EXCISION OF BASAL CELL CA LOWER EYELID W/FROZEN SECTION, RECONSTRUCTION;  Surgeon: Stefania Allen MD;  Location: [address] [place] [address];  Service: Plastics      Family History:     Family History   Problem Relation Age of Onset   • Heart disease Father         CARDIOVASCULAR   • Stroke Father       Social History:     Social History     Socioeconomic History   • Marital status: /Civil Union     Spouse name: None   • Number of children: None   • Years of education: None   • Highest education level: None   Occupational History   • None   Tobacco Use   • Smoking status: Never   • Smokeless tobacco: Never   • Tobacco comments:     no exposure to passive smoke   Vaping Use   • Vaping Use: Never used   Substance and Sexual Activity   • Alcohol use: Not Currently     Comment: rarely   • Drug use: No   • Sexual activity: None   Other Topics Concern   • None   Social History Narrative   • None     Social Determinants of Health     Financial Resource Strain: Not on file   Food Insecurity: Not on file   Transportation Needs: Not on file   Physical Activity: Not on file   Stress: Not on file   Social Connections: Not on file   Intimate Partner Violence: Not on file   Housing Stability: Not on file      Medications and Allergies:     Current Outpatient Medications   Medication Sig Dispense Refill   • losartan (COZAAR) 50 mg tablet Take 2 tablets (100 mg total) by mouth daily 180 tablet 3   • omeprazole (PriLOSEC) 40 MG capsule TAKE 1 CAPSULE DAILY 90 capsule 3   • GaviLyte-G 236 g solution TAKE 4000 ML BY MOUTH AS DIRECTED, FOLLOW INSTRUCTIONS PROVIDED BY OFFICE. (Patient not taking: Reported on 2/9/2023)     • ibuprofen (MOTRIN) 600 mg tablet One tablet tid with food (Patient not taking: Reported on 2/9/2023) 270 tablet 3     No current facility-administered medications for this visit.      No Known Allergies   Immunizations:     Immunization History   Administered Date(s) Administered   • COVID-19 PFIZER VACCINE 0.3 ML IM 02/14/2021, 03/31/2021, 10/26/2021   • COVID-19 Pfizer vac (Samuel-sucrose, gray cap) 12 yr+ IM 04/21/2022   • INFLUENZA 10/11/2016, 10/03/2017, 10/10/2022   • Influenza Split High Dose Preservative Free IM 10/11/2016, 10/03/2017   • Influenza, high dose seasonal 0.7 mL 10/03/2018, 10/11/2019, 10/09/2020, 10/18/2021, 10/10/2022   • Influenza, seasonal, injectable 10/02/2015   • Pneumococcal Conjugate 13-Valent 04/17/2015   • Pneumococcal Polysaccharide PPV23 07/30/2013   • Tdap 03/29/2011   • Zoster 11/06/2013      Health Maintenance:         Topic Date Due   • Hepatitis C Screening  Completed   • Colorectal Cancer Screening  Discontinued         Topic Date Due   • COVID-19 Vaccine (5 - Pfizer series) 06/16/2022   • Influenza Vaccine (1) 09/01/2023      Medicare Screening Tests and Risk Assessments:     Shin Topete is here for his Subsequent Wellness visit. Health Risk Assessment:   Patient rates overall health as very good. Patient feels that their physical health rating is same. Patient is satisfied with their life. Eyesight was rated as same. Hearing was rated as same. Patient feels that their emotional and mental health rating is same. Patients states they are never, rarely angry. Patient states they are never, rarely unusually tired/fatigued. Pain experienced in the last 7 days has been a lot. Patient's pain rating has been 4/10. Patient states that he has experienced no weight loss or gain in last 6 months. Fall Risk Screening: In the past year, patient has experienced: no history of falling in past year      Home Safety:  Patient does not have trouble with stairs inside or outside of their home. Patient has working smoke alarms and has working carbon monoxide detector. Home safety hazards include: none. Nutrition:   Current diet is Regular. Medications:   Patient is not currently taking any over-the-counter supplements. Patient is able to manage medications. Activities of Daily Living (ADLs)/Instrumental Activities of Daily Living (IADLs):   Walk and transfer into and out of bed and chair?: Yes  Dress and groom yourself?: Yes    Bathe or shower yourself?: Yes    Feed yourself? Yes  Do your laundry/housekeeping?: Yes  Manage your money, pay your bills and track your expenses?: Yes  Make your own meals?: Yes    Do your own shopping?: Yes    Previous Hospitalizations:   Any hospitalizations or ED visits within the last 12 months?: No      Advance Care Planning:   Living will: Yes    Durable POA for healthcare:  Yes    Advanced directive: Yes      PREVENTIVE SCREENINGS      Cardiovascular Screening:    General: Screening Current      Prostate Cancer Screening:    General: Screening Not Indicated      Lung Cancer Screening:     General: Screening Not Indicated      Hepatitis C Screening:    General: Screening Current    Screening, Brief Intervention, and Referral to Treatment (SBIRT)    Screening  Typical number of drinks in a day: 0  Typical number of drinks in a week: 0  Interpretation: Low risk drinking behavior. Single Item Drug Screening:  How often have you used an illegal drug (including marijuana) or a prescription medication for non-medical reasons in the past year? never    Single Item Drug Screen Score: 0  Interpretation: Negative screen for possible drug use disorder    No results found. Physical Exam:     There were no vitals taken for this visit.     Physical Exam     Beverly Medrano MD

## 2023-08-16 NOTE — PATIENT INSTRUCTIONS
I recommend updating your COVID booster whenever you have a chance. I have also recommend the high-dose flu shot in October or November. Medicare Preventive Visit Patient Instructions  Thank you for completing your Welcome to Medicare Visit or Medicare Annual Wellness Visit today. Your next wellness visit will be due in one year (8/16/2024). The screening/preventive services that you may require over the next 5-10 years are detailed below. Some tests may not apply to you based off risk factors and/or age. Screening tests ordered at today's visit but not completed yet may show as past due. Also, please note that scanned in results may not display below. Preventive Screenings:  Service Recommendations Previous Testing/Comments   Colorectal Cancer Screening  · Colonoscopy    · Fecal Occult Blood Test (FOBT)/Fecal Immunochemical Test (FIT)  · Fecal DNA/Cologuard Test  · Flexible Sigmoidoscopy Age: 43-73 years old   Colonoscopy: every 10 years (May be performed more frequently if at higher risk)  OR  FOBT/FIT: every 1 year  OR  Cologuard: every 3 years  OR  Sigmoidoscopy: every 5 years  Screening may be recommended earlier than age 39 if at higher risk for colorectal cancer. Also, an individualized decision between you and your healthcare provider will decide whether screening between the ages of 77-80 would be appropriate.  Colonoscopy: 07/03/2012  FOBT/FIT: Not on file  Cologuard: Not on file  Sigmoidoscopy: Not on file    Screening Current     Prostate Cancer Screening Individualized decision between patient and health care provider in men between ages of 53-66   Medicare will cover every 12 months beginning on the day after your 50th birthday PSA: No results in last 5 years     Screening Not Indicated  Screening Not Indicated     Hepatitis C Screening Once for adults born between [address] [place] [address]  More frequently in patients at high risk for Hepatitis C Hep C Antibody: 07/27/2021    Screening Current  Screening Current   Diabetes Screening 1-2 times per year if you're at risk for diabetes or have pre-diabetes Fasting glucose: No results in last 5 years (No results in last 5 years)  A1C: No results in last 5 years (No results in last 5 years)      Cholesterol Screening Once every 5 years if you don't have a lipid disorder. May order more often based on risk factors. Lipid panel: 03/07/2023  Screening Current  Screening Current      Other Preventive Screenings Covered by Medicare:  1. Abdominal Aortic Aneurysm (AAA) Screening: covered once if your at risk. You're considered to be at risk if you have a family history of AAA or a male between the age of 70-76 who smoking at least 100 cigarettes in your lifetime. 2. Lung Cancer Screening: covers low dose CT scan once per year if you meet all of the following conditions: (1) Age 48-67; (2) No signs or symptoms of lung cancer; (3) Current smoker or have quit smoking within the last 15 years; (4) You have a tobacco smoking history of at least 20 pack years (packs per day x number of years you smoked); (5) You get a written order from a healthcare provider. 3. Glaucoma Screening: covered annually if you're considered high risk: (1) You have diabetes OR (2) Family history of glaucoma OR (3)  aged 48 and older OR (3)  American aged 72 and older  3. Osteoporosis Screening: covered every 2 years if you meet one of the following conditions: (1) Have a vertebral abnormality; (2) On glucocorticoid therapy for more than 3 months; (3) Have primary hyperparathyroidism; (4) On osteoporosis medications and need to assess response to drug therapy. 5. HIV Screening: covered annually if you're between the age of 14-79. Also covered annually if you are younger than 13 and older than 72 with risk factors for HIV infection. For pregnant patients, it is covered up to 3 times per pregnancy.     Immunizations:  Immunization Recommendations   Influenza Vaccine Annual influenza vaccination during flu season is recommended for all persons aged >= 6 months who do not have contraindications   Pneumococcal Vaccine   * Pneumococcal conjugate vaccine = PCV13 (Prevnar 13), PCV15 (Vaxneuvance), PCV20 (Prevnar 20)  * Pneumococcal polysaccharide vaccine = PPSV23 (Pneumovax) Adults 20-63 years old: 1-3 doses may be recommended based on certain risk factors  Adults 72 years old: 1-2 doses may be recommended based off what pneumonia vaccine you previously received   Hepatitis B Vaccine 3 dose series if at intermediate or high risk (ex: diabetes, end stage renal disease, liver disease)   Tetanus (Td) Vaccine - COST NOT COVERED BY MEDICARE PART B Following completion of primary series, a booster dose should be given every 10 years to maintain immunity against tetanus. Td may also be given as tetanus wound prophylaxis. Tdap Vaccine - COST NOT COVERED BY MEDICARE PART B Recommended at least once for all adults. For pregnant patients, recommended with each pregnancy. Shingles Vaccine (Shingrix) - COST NOT COVERED BY MEDICARE PART B  2 shot series recommended in those aged 48 and above     Health Maintenance Due:      Topic Date Due   • Hepatitis C Screening  Completed   • Colorectal Cancer Screening  Discontinued     Immunizations Due:      Topic Date Due   • COVID-19 Vaccine (5 - Pfizer series) 06/16/2022   • Influenza Vaccine (1) 09/01/2023     Advance Directives   What are advance directives? Advance directives are legal documents that state your wishes and plans for medical care. These plans are made ahead of time in case you lose your ability to make decisions for yourself. Advance directives can apply to any medical decision, such as the treatments you want, and if you want to donate organs. What are the types of advance directives? There are many types of advance directives, and each state has rules about how to use them.  You may choose a combination of any of the following:  · Living will: This is a written record of the treatment you want. You can also choose which treatments you do not want, which to limit, and which to stop at a certain time. This includes surgery, medicine, IV fluid, and tube feedings. · Durable power of  for [facility]): This is a written record that states who you want to make healthcare choices for you when you are unable to make them for yourself. This person, called a proxy, is usually a family member or a friend. You may choose more than 1 proxy. · Do not resuscitate (DNR) order:  A DNR order is used in case your heart stops beating or you stop breathing. It is a request not to have certain forms of treatment, such as CPR. A DNR order may be included in other types of advance directives. · Medical directive: This covers the care that you want if you are in a coma, near death, or unable to make decisions for yourself. You can list the treatments you want for each condition. Treatment may include pain medicine, surgery, blood transfusions, dialysis, IV or tube feedings, and a ventilator (breathing machine). · Values history: This document has questions about your views, beliefs, and how you feel and think about life. This information can help others choose the care that you would choose. Why are advance directives important? An advance directive helps you control your care. Although spoken wishes may be used, it is better to have your wishes written down. Spoken wishes can be misunderstood, or not followed. Treatments may be given even if you do not want them. An advance directive may make it easier for your family to make difficult choices about your care. Weight Management   Why it is important to manage your weight:  Being overweight increases your risk of health conditions such as heart disease, high blood pressure, type 2 diabetes, and certain types of cancer.  It can also increase your risk for osteoarthritis, sleep apnea, and other respiratory problems. Aim for a slow, steady weight loss. Even a small amount of weight loss can lower your risk of health problems. How to lose weight safely:  A safe and healthy way to lose weight is to eat fewer calories and get regular exercise. You can lose up about 1 pound a week by decreasing the number of calories you eat by 500 calories each day. Healthy meal plan for weight management:  A healthy meal plan includes a variety of foods, contains fewer calories, and helps you stay healthy. A healthy meal plan includes the following:  · Eat whole-grain foods more often. A healthy meal plan should contain fiber. Fiber is the part of grains, fruits, and vegetables that is not broken down by your body. Whole-grain foods are healthy and provide extra fiber in your diet. Some examples of whole-grain foods are whole-wheat breads and pastas, oatmeal, brown rice, and bulgur. · Eat a variety of vegetables every day. Include dark, leafy greens such as spinach, kale, casper greens, and mustard greens. Eat yellow and orange vegetables such as carrots, sweet potatoes, and winter squash. · Eat a variety of fruits every day. Choose fresh or canned fruit (canned in its own juice or light syrup) instead of juice. Fruit juice has very little or no fiber. · Eat low-fat dairy foods. Drink fat-free (skim) milk or 1% milk. Eat fat-free yogurt and low-fat cottage cheese. Try low-fat cheeses such as mozzarella and other reduced-fat cheeses. · Choose meat and other protein foods that are low in fat. Choose beans or other legumes such as split peas or lentils. Choose fish, skinless poultry (chicken or turkey), or lean cuts of red meat (beef or pork). Before you cook meat or poultry, cut off any visible fat. · Use less fat and oil. Try baking foods instead of frying them. Add less fat, such as margarine, sour cream, regular salad dressing and mayonnaise to foods. Eat fewer high-fat foods.  Some examples of high-fat foods include french fries, doughnuts, ice cream, and cakes. · Eat fewer sweets. Limit foods and drinks that are high in sugar. This includes candy, cookies, regular soda, and sweetened drinks. Exercise:  Exercise at least 30 minutes per day on most days of the week. Some examples of exercise include walking, biking, dancing, and swimming. You can also fit in more physical activity by taking the stairs instead of the elevator or parking farther away from stores. Ask your healthcare provider about the best exercise plan for you. © Copyright [employer] 2018 Information is for End User's use only and may not be sold, redistributed or otherwise used for commercial purposes.  All illustrations and images included in CareNotes® are the copyrighted property of [employer] or [address] [place] [address]

## 2023-09-13 ENCOUNTER — ESTABLISHED COMPREHENSIVE EXAM (OUTPATIENT)
Dept: URBAN - METROPOLITAN AREA CLINIC 6 | Facility: CLINIC | Age: 78
End: 2023-09-13

## 2023-09-13 DIAGNOSIS — H25.13: ICD-10-CM

## 2023-09-13 DIAGNOSIS — H04.123: ICD-10-CM

## 2023-09-13 PROCEDURE — 92014 COMPRE OPH EXAM EST PT 1/>: CPT

## 2023-09-13 ASSESSMENT — VISUAL ACUITY
OD_CC: 20/25
OS_CC: 20/25+1

## 2023-09-13 ASSESSMENT — TONOMETRY
OD_IOP_MMHG: 14
OS_IOP_MMHG: 15

## 2023-10-16 DIAGNOSIS — M54.50 LOW BACK PAIN, UNSPECIFIED BACK PAIN LATERALITY, UNSPECIFIED CHRONICITY, UNSPECIFIED WHETHER SCIATICA PRESENT: ICD-10-CM

## 2023-10-16 RX ORDER — IBUPROFEN 600 MG/1
TABLET ORAL
Qty: 270 TABLET | Refills: 3 | Status: SHIPPED | OUTPATIENT
Start: 2023-10-16

## 2023-12-08 ENCOUNTER — TELEPHONE (OUTPATIENT)
Dept: FAMILY MEDICINE CLINIC | Facility: CLINIC | Age: 78
End: 2023-12-08

## 2023-12-08 NOTE — TELEPHONE ENCOUNTER
Calling for hip injection by Dr Robin Ray   wants Dr Robin Ray to know he called  no appt til April  did not make appt

## 2023-12-12 ENCOUNTER — OFFICE VISIT (OUTPATIENT)
Dept: FAMILY MEDICINE CLINIC | Facility: CLINIC | Age: 78
End: 2023-12-12
Payer: COMMERCIAL

## 2023-12-12 VITALS
WEIGHT: 187 LBS | HEART RATE: 52 BPM | TEMPERATURE: 98.6 F | DIASTOLIC BLOOD PRESSURE: 96 MMHG | RESPIRATION RATE: 18 BRPM | BODY MASS INDEX: 26.45 KG/M2 | OXYGEN SATURATION: 100 % | SYSTOLIC BLOOD PRESSURE: 156 MMHG

## 2023-12-12 DIAGNOSIS — M70.62 TROCHANTERIC BURSITIS OF BOTH HIPS: Primary | ICD-10-CM

## 2023-12-12 DIAGNOSIS — M70.61 TROCHANTERIC BURSITIS OF BOTH HIPS: Primary | ICD-10-CM

## 2023-12-12 PROCEDURE — 99213 OFFICE O/P EST LOW 20 MIN: CPT | Performed by: FAMILY MEDICINE

## 2023-12-12 PROCEDURE — 20610 DRAIN/INJ JOINT/BURSA W/O US: CPT | Performed by: FAMILY MEDICINE

## 2023-12-12 NOTE — PROGRESS NOTES
Assessment/Plan:    No problem-specific Assessment & Plan notes found for this encounter. Subjective:      Patient ID: Mateusz Perkins is a 69 y.o. male. Bilat hips pain : imjected in past to good relief. Hip Pain         The following portions of the patient's history were reviewed and updated as appropriate: allergies, current medications, past family history, past medical history, past social history, past surgical history and problem list.    Review of Systems   Musculoskeletal:  Positive for arthralgias and gait problem. Objective:  Vitals:    12/12/23 1146   BP: 156/96   BP Location: Left arm   Patient Position: Sitting   Cuff Size: Standard   Pulse: (!) 52   Resp: 18   Temp: 98.6 °F (37 °C)   TempSrc: Temporal   SpO2: 100%   Weight: 84.8 kg (187 lb)      Physical Exam  Musculoskeletal:         General: Tenderness present. Comments: Bilat trochanteric bursitis             Large joint arthrocentesis  Universal Protocol:  Consent: Verbal consent obtained. Supporting Documentation  Indications: pain   Procedure Details  Needle size: 25 G  Ultrasound guidance: no  Approach: lateral    Dressing:  Sterile dressing applied      Large joint arthrocentesis  Universal Protocol:  Consent: Verbal consent obtained.   Supporting Documentation  Indications: pain   Procedure Details  Location: hip -   Needle size: 25 G  Approach: lateral

## 2023-12-12 NOTE — PATIENT INSTRUCTIONS
I suggest a combination THC/CBD cream.  [place] [facility]. Check with one of the dispensaries there and ask for the most potent combination cream they have.     Try it for about 2 weeks 3-4 times a day and see how he is work

## 2023-12-19 ENCOUNTER — TELEPHONE (OUTPATIENT)
Dept: FAMILY MEDICINE CLINIC | Facility: CLINIC | Age: 78
End: 2023-12-19

## 2023-12-19 NOTE — TELEPHONE ENCOUNTER
Patient called and said if you can gives his wife the shoot and that she is in so much pain and he also wants to go forward of what you guys discuss when he had a appointment

## 2023-12-29 DIAGNOSIS — K21.9 GASTROESOPHAGEAL REFLUX DISEASE WITHOUT ESOPHAGITIS: ICD-10-CM

## 2023-12-29 RX ORDER — OMEPRAZOLE 40 MG/1
CAPSULE, DELAYED RELEASE ORAL
Qty: 90 CAPSULE | Refills: 3 | Status: SHIPPED | OUTPATIENT
Start: 2023-12-29

## 2024-01-04 ENCOUNTER — TELEPHONE (OUTPATIENT)
Dept: FAMILY MEDICINE CLINIC | Facility: CLINIC | Age: 79
End: 2024-01-04

## 2024-01-04 NOTE — TELEPHONE ENCOUNTER
Please advice pt Voicemail           this is a message for Doctor Cory Ye. This is Brad Street calling and my birthday is [id number]. I'm also calling about my wife. Her birthday is [birth year]. Our phone number is [phone]. We had our flu shots in December. We have not had the COVID yet, but we've had sore throats, mucus in her throat. It's a constant. It's a we have difficult time swallowing night. It's difficult night. My wife is taking secrets to help her a little bit. We're just wondering what we should do. Have a dry cough a lot. Runny nose is daily and just need a little guidance as to you know what next thing we should do. Our number is [phone]. Appreciate it if I can get a phone call back from Doctor Ye.

## 2024-01-26 ENCOUNTER — VBI (OUTPATIENT)
Dept: ADMINISTRATIVE | Facility: OTHER | Age: 79
End: 2024-01-26

## 2024-02-03 DIAGNOSIS — I10 PRIMARY HYPERTENSION: Chronic | ICD-10-CM

## 2024-02-03 NOTE — TELEPHONE ENCOUNTER
Medication Refill Request     Name losartan (COZAAR) 50 mg tablet    Dose/Frequency 2 tablet daily  Quantity 180  Verified pharmacy   [x]  Verified ordering Provider   [x]  Does patient have enough for the next 3 days? Yes [x] No []

## 2024-02-05 RX ORDER — LOSARTAN POTASSIUM 50 MG/1
100 TABLET ORAL DAILY
Qty: 180 TABLET | Refills: 1 | Status: SHIPPED | OUTPATIENT
Start: 2024-02-05

## 2024-02-22 ENCOUNTER — TELEPHONE (OUTPATIENT)
Age: 79
End: 2024-02-22

## 2024-02-22 NOTE — TELEPHONE ENCOUNTER
"Received call from patient asking specifically to speak with Dr. Ye regarding questions that he has about a hernia and \"a couple other things\". Patient does not wish to discuss his questions with this RN. Please have Dr Ye contact patient.  "

## 2024-02-26 NOTE — TELEPHONE ENCOUNTER
Patient for an update on his prior message regarding his Hernia.    Patient would like to speak to Dr. Ye.      Please review and advice  Thank you

## 2024-03-13 ENCOUNTER — TELEPHONE (OUTPATIENT)
Age: 79
End: 2024-03-13

## 2024-03-13 NOTE — TELEPHONE ENCOUNTER
Pt refuses to make an apt with anyone but Dr. Ye, I tried. Made him aware he doesn't have any openings for awhile. Pt is also aware he is retiring and will eventually pick another provider. He wants to let Dr. Ye know that he cannot sleep on his back. He can only get a little bit of sleep in his chair, and has been applying heat. Would like to know if Dr. Ye can give him any more advice.

## 2024-03-14 DIAGNOSIS — M70.61 TROCHANTERIC BURSITIS OF BOTH HIPS: Primary | ICD-10-CM

## 2024-03-14 DIAGNOSIS — M70.62 TROCHANTERIC BURSITIS OF BOTH HIPS: Primary | ICD-10-CM

## 2024-03-14 RX ORDER — MELOXICAM 15 MG/1
15 TABLET ORAL DAILY
Qty: 7 TABLET | Refills: 1 | Status: SHIPPED | OUTPATIENT
Start: 2024-03-14

## 2024-04-07 ENCOUNTER — TELEPHONE (OUTPATIENT)
Dept: OTHER | Facility: OTHER | Age: 79
End: 2024-04-07

## 2024-04-07 NOTE — TELEPHONE ENCOUNTER
Pt would like a call back from Dr. Ye, he had a hernia operation and would like to speak with him.

## 2024-06-19 ENCOUNTER — OFFICE VISIT (OUTPATIENT)
Dept: FAMILY MEDICINE CLINIC | Facility: CLINIC | Age: 79
End: 2024-06-19
Payer: COMMERCIAL

## 2024-06-19 ENCOUNTER — TELEPHONE (OUTPATIENT)
Age: 79
End: 2024-06-19

## 2024-06-19 VITALS
BODY MASS INDEX: 24.98 KG/M2 | OXYGEN SATURATION: 99 % | DIASTOLIC BLOOD PRESSURE: 86 MMHG | WEIGHT: 178.4 LBS | TEMPERATURE: 97.9 F | HEART RATE: 73 BPM | SYSTOLIC BLOOD PRESSURE: 150 MMHG | HEIGHT: 71 IN

## 2024-06-19 DIAGNOSIS — M54.50 LOW BACK PAIN, UNSPECIFIED BACK PAIN LATERALITY, UNSPECIFIED CHRONICITY, UNSPECIFIED WHETHER SCIATICA PRESENT: ICD-10-CM

## 2024-06-19 DIAGNOSIS — R80.9 POSITIVE FOR MICROALBUMINURIA: Chronic | ICD-10-CM

## 2024-06-19 DIAGNOSIS — M25.552 BILATERAL HIP PAIN: ICD-10-CM

## 2024-06-19 DIAGNOSIS — E78.6 LOW HDL (UNDER 40): Chronic | ICD-10-CM

## 2024-06-19 DIAGNOSIS — I10 PRIMARY HYPERTENSION: Primary | Chronic | ICD-10-CM

## 2024-06-19 DIAGNOSIS — I47.19 ATRIAL TACHYCARDIA: ICD-10-CM

## 2024-06-19 DIAGNOSIS — K21.9 GASTROESOPHAGEAL REFLUX DISEASE WITHOUT ESOPHAGITIS: ICD-10-CM

## 2024-06-19 DIAGNOSIS — M25.551 BILATERAL HIP PAIN: ICD-10-CM

## 2024-06-19 PROCEDURE — 99214 OFFICE O/P EST MOD 30 MIN: CPT | Performed by: FAMILY MEDICINE

## 2024-06-19 RX ORDER — IBUPROFEN 600 MG/1
TABLET ORAL
Qty: 270 TABLET | Refills: 3 | Status: SHIPPED | OUTPATIENT
Start: 2024-06-19

## 2024-06-19 RX ORDER — OMEPRAZOLE 40 MG/1
40 CAPSULE, DELAYED RELEASE ORAL DAILY
Qty: 90 CAPSULE | Refills: 3 | Status: SHIPPED | OUTPATIENT
Start: 2024-06-19

## 2024-06-19 RX ORDER — LOSARTAN POTASSIUM 50 MG/1
100 TABLET ORAL DAILY
Qty: 180 TABLET | Refills: 3 | Status: SHIPPED | OUTPATIENT
Start: 2024-06-19

## 2024-06-19 NOTE — TELEPHONE ENCOUNTER
Patient called to follow up after today's OV.  Stated Dr. Ye mentioned he should see a skin specialist, but forgot to get the information on who/where he should schedule.  He stated that information/referral can be mailed to him, as he does not use [facility].    Thank you!

## 2024-06-19 NOTE — PATIENT INSTRUCTIONS
Take 1 container of activity a yogurt every day for 10 days and I will have the gas problem.    If that does not seem to work very well there is a product off the shelf called Gas-X that you could use.

## 2024-06-19 NOTE — PROGRESS NOTES
"Assessment/Plan:    No problem-specific Assessment & Plan notes found for this encounter.             Subjective:      Patient ID: Brad Street is a 79 y.o. male.      PATIENT RETURNS FOR FOLLOW-UP OF CHRONIC MEDICAL CONDITIONS.  ANY HOSPITAL VISITS, EMERGENCY VISITS AND OTHER PROVIDER VISITS SINCE LAST TIME WERE REVIEWED.  MEDS WERE REVIEWED AND NO SIDE EFFECTS.  NO NEW ISSUES  UNLESS NOTED BELOW. NO NEW MEDICAL PROVIDER REPORTED. THE CHRONIC DISEASES LISTED ABOVE ARE STABLE AND UNCHANGED/ THE PLAN OF CARE FOR THOSE WILL REMAIN UNCHANGED UNLESS NOTED BELOW.      Flaring L hip bursitis; shots help 2-3 mos         The following portions of the patient's history were reviewed and updated as appropriate: allergies, current medications, past family history, past medical history, past social history, past surgical history and problem list.    Review of Systems   Constitutional:  Negative for activity change and appetite change.   HENT:  Negative for trouble swallowing.    Eyes:  Negative for visual disturbance.   Respiratory:  Negative for cough and shortness of breath.    Cardiovascular:  Negative for chest pain, palpitations and leg swelling.   Gastrointestinal:  Negative for abdominal pain and blood in stool.   Endocrine: Negative for polyuria.   Genitourinary:  Negative for difficulty urinating and hematuria.   Musculoskeletal:  Positive for arthralgias and gait problem.   Skin:  Negative for rash.   Neurological:  Negative for dizziness.   Psychiatric/Behavioral:  Negative for behavioral problems.          Objective:  Vitals:    06/19/24 1108   BP: 150/86   BP Location: Left arm   Patient Position: Sitting   Cuff Size: Adult   Pulse: 73   Temp: 97.9 °F (36.6 °C)   TempSrc: Temporal   SpO2: 99%   Weight: 80.9 kg (178 lb 6.4 oz)   Height: 5' 10.5\" (1.791 m)      Physical Exam  Constitutional:       Appearance: Normal appearance.   HENT:      Head: Normocephalic and atraumatic.      Right Ear: Tympanic membrane and " ear canal normal.      Left Ear: Tympanic membrane and ear canal normal.      Mouth/Throat:      Mouth: Mucous membranes are moist.      Pharynx: Oropharynx is clear.   Eyes:      Conjunctiva/sclera: Conjunctivae normal.   Cardiovascular:      Rate and Rhythm: Normal rate and regular rhythm.      Pulses: Normal pulses.      Heart sounds: Normal heart sounds. No murmur heard.  Pulmonary:      Effort: Pulmonary effort is normal. No respiratory distress.      Breath sounds: Normal breath sounds.   Musculoskeletal:      Cervical back: Neck supple.   Lymphadenopathy:      Cervical: No cervical adenopathy.   Neurological:      Mental Status: He is alert. Mental status is at baseline.   Psychiatric:         Mood and Affect: Mood normal.         Thought Content: Thought content normal.           Large joint arthrocentesis  Universal Protocol:  Consent: Verbal consent obtained.  Patient identity confirmed: verbally with patient  Procedure Details  Needle size: 25 G  Ultrasound guidance: no  Approach: lateral        60 mg steroid / 1.5 cc lido 1%        Recurrent L hip bursitis     Injected today

## 2024-06-20 NOTE — TELEPHONE ENCOUNTER
Lvm :    Have him call [facility]: [phone]; this is the [place] office but they have others in the area that might be more convenient

## 2024-06-21 ENCOUNTER — TELEPHONE (OUTPATIENT)
Age: 79
End: 2024-06-21

## 2024-06-21 NOTE — TELEPHONE ENCOUNTER
Patient called to ask for Dr. Ye to recommend a physician that the patient could see in the [place] area. Please call the patient at [phone].

## 2024-07-15 ENCOUNTER — TELEPHONE (OUTPATIENT)
Age: 79
End: 2024-07-15

## 2024-08-27 NOTE — PROGRESS NOTES
Ambulatory Visit  Name: Brad Street      : 1945      MRN: [id number]  Encounter Provider: Desmond Dinh MD  Encounter Date: 2024   Encounter department: [facility]    Assessment & Plan   1. Primary hypertension  2. Gastroesophageal reflux disease without esophagitis  3. Low HDL (under 40)  4. Primary osteoarthritis of both hips  5. Lumbar disc disease  6. Facet arthritis of lumbar region    Continue with current medications.  Follow-up visit before the end of the year for AWV.    Flu vaccine in the fall         History of Present Illness       First office visit for this 79-year-old male medications reviewed.  Hospitalization/surgery/social history/family history reviewed see note.    Hypertension blood pressures have been stable on Losartan 100 mg daily. 2024 creatinine 1.15.  GFR 65.  Electrolytes normal except for bicarb 32.Hemoglobin 14.0. Lipid profile cholesterol 122.  Triglycerides 57.  HDL 40.  LDL 71.  LFTs normal except for total bilirubin 1.5      Review of Systems   Constitutional:  Negative for appetite change, chills, fever and unexpected weight change.   HENT:  Negative for congestion, ear pain, rhinorrhea, sore throat and trouble swallowing.    Eyes:  Negative for visual disturbance.   Respiratory:  Negative for cough, shortness of breath and wheezing.    Cardiovascular:  Negative for chest pain, palpitations and leg swelling.        3/2023 echocardiogram normal left ventricular systolic function.  EF 60 to 65%.  Left ventricle size normal.  Wall motion normal.  Right ventricle normal.  Mild to moderate tricuspid  regurgitation. 3/2023 Holter monitor normal sinus rhythm.  Infrequent PACs PACs occasional short runs of SVT.  No AF    He is followed by Cardiology yearly    Gastrointestinal:  Negative for abdominal pain, blood in stool, constipation, diarrhea, nausea and vomiting.        GERD on Omeprazole 40 mg daily   Endocrine: Negative for polydipsia and polyuria.    Genitourinary:  Negative for difficulty urinating.   Musculoskeletal:  Positive for arthralgias and back pain. Negative for myalgias.        Osteoarthritis hips/lumbar facet disease.  Patient uses infrequent as needed ibuprofen He has a daily stretching regimen   Skin:  Negative for rash.   Allergic/Immunologic: Negative for environmental allergies.   Neurological:  Negative for dizziness and headaches.   Hematological:  Negative for adenopathy. Does not bruise/bleed easily.   Psychiatric/Behavioral:  Negative for dysphoric mood and sleep disturbance.      Past Medical History:   Diagnosis Date    Chronic pain disorder     back    GERD (gastroesophageal reflux disease)     Seasonal allergies     Trochanteric bursitis of both hips 12/12/2023    recurrent       Past Surgical History:   Procedure Laterality Date    COLONOSCOPY      HERNIA REPAIR  05/2024    groin area    KNEE ARTHROCENTESIS Right     LATERAL JOINT LINE    IL ADJT TIS TRNSFR/REARRGMT E/N/E/L DFCT 10 SQ CM/< Left 01/22/2019    Procedure: EXCISION OF BASAL CELL CA LOWER EYELID W/FROZEN SECTION, RECONSTRUCTION;  Surgeon: Dakota Underwood MD;  Location:  MAIN OR;  Service: Plastics     Family History   Problem Relation Age of Onset    Heart disease Father         CARDIOVASCULAR    Stroke Father      Social History     Tobacco Use    Smoking status: Never     Passive exposure: Never    Smokeless tobacco: Never    Tobacco comments:     no exposure to passive smoke   Vaping Use    Vaping status: Never Used   Substance and Sexual Activity    Alcohol use: Not Currently     Comment: rarely    Drug use: No    Sexual activity: Not Currently     Current Outpatient Medications on File Prior to Visit   Medication Sig    ibuprofen (MOTRIN) 600 mg tablet One tablet tid with food    losartan (COZAAR) 50 mg tablet Take 2 tablets (100 mg total) by mouth daily    omeprazole (PriLOSEC) 40 MG capsule Take 1 capsule (40 mg total) by mouth daily     No Known  "Allergies  Immunization History   Administered Date(s) Administered    COVID-19 PFIZER VACCINE 0.3 ML IM 02/14/2021, 03/31/2021, 10/26/2021    COVID-19 Pfizer vac (Samuel-sucrose, gray cap) 12 yr+ IM 04/21/2022    INFLUENZA 10/11/2016, 10/03/2017, 10/10/2022, 11/16/2023    Influenza Split High Dose Preservative Free IM 10/11/2016, 10/03/2017    Influenza, high dose seasonal 0.7 mL 10/03/2018, 10/11/2019, 10/09/2020, 10/18/2021, 10/10/2022    Influenza, seasonal, injectable 10/02/2015    Pneumococcal Conjugate 13-Valent 04/17/2015    Pneumococcal Polysaccharide PPV23 07/30/2013    Tdap 03/29/2011    Zoster 11/06/2013     Objective       /86 (BP Location: Right arm, Patient Position: Sitting, Cuff Size: Large)   Pulse 60   Temp 97.9 °F (36.6 °C)   Resp 18   Ht 5' 9.5\" (1.765 m)   Wt 80.7 kg (178 lb)   SpO2 98%   BMI 25.91 kg/m²     BP Readings from Last 3 Encounters:   08/28/24 138/86   06/19/24 150/86   12/12/23 156/96     Wt Readings from Last 3 Encounters:   08/28/24 80.7 kg (178 lb)   08/19/24 80.7 kg (178 lb)   06/19/24 80.9 kg (178 lb 6.4 oz)         Physical Exam  Constitutional:       General: He is not in acute distress.  HENT:      Right Ear: Tympanic membrane and ear canal normal.      Left Ear: Tympanic membrane and ear canal normal.      Mouth/Throat:      Mouth: No oral lesions.      Pharynx: Oropharynx is clear.   Eyes:      General: No scleral icterus.     Extraocular Movements: Extraocular movements intact.      Conjunctiva/sclera: Conjunctivae normal.      Pupils: Pupils are equal, round, and reactive to light.   Neck:      Thyroid: No thyroid mass or thyromegaly.      Vascular: Normal carotid pulses. No carotid bruit.   Cardiovascular:      Rate and Rhythm: Normal rate and regular rhythm.      Heart sounds: No murmur heard.     No gallop.   Pulmonary:      Effort: Pulmonary effort is normal.      Breath sounds: Normal breath sounds. No wheezing or rales.   Musculoskeletal:      Right " lower leg: No edema.      Left lower leg: No edema.   Lymphadenopathy:      Cervical: No cervical adenopathy.   Skin:     Findings: No rash.   Neurological:      General: No focal deficit present.      Mental Status: He is alert and oriented to person, place, and time.   Psychiatric:         Mood and Affect: Mood normal.

## 2024-08-28 ENCOUNTER — OFFICE VISIT (OUTPATIENT)
Dept: FAMILY MEDICINE CLINIC | Facility: CLINIC | Age: 79
End: 2024-08-28
Payer: COMMERCIAL

## 2024-08-28 VITALS
RESPIRATION RATE: 18 BRPM | TEMPERATURE: 97.9 F | BODY MASS INDEX: 25.48 KG/M2 | HEART RATE: 60 BPM | DIASTOLIC BLOOD PRESSURE: 86 MMHG | SYSTOLIC BLOOD PRESSURE: 138 MMHG | HEIGHT: 70 IN | WEIGHT: 178 LBS | OXYGEN SATURATION: 98 %

## 2024-08-28 DIAGNOSIS — I10 PRIMARY HYPERTENSION: Primary | Chronic | ICD-10-CM

## 2024-08-28 DIAGNOSIS — M47.816 FACET ARTHRITIS OF LUMBAR REGION: ICD-10-CM

## 2024-08-28 DIAGNOSIS — M16.0 PRIMARY OSTEOARTHRITIS OF BOTH HIPS: ICD-10-CM

## 2024-08-28 DIAGNOSIS — E78.6 LOW HDL (UNDER 40): Chronic | ICD-10-CM

## 2024-08-28 DIAGNOSIS — K21.9 GASTROESOPHAGEAL REFLUX DISEASE WITHOUT ESOPHAGITIS: ICD-10-CM

## 2024-08-28 DIAGNOSIS — M51.9 LUMBAR DISC DISEASE: ICD-10-CM

## 2024-08-28 PROBLEM — Z00.00 MEDICARE ANNUAL WELLNESS VISIT, SUBSEQUENT: Chronic | Status: RESOLVED | Noted: 2019-06-18 | Resolved: 2024-08-28

## 2024-08-28 PROBLEM — M70.62 TROCHANTERIC BURSITIS OF BOTH HIPS: Chronic | Status: RESOLVED | Noted: 2023-12-12 | Resolved: 2024-08-28

## 2024-08-28 PROBLEM — M25.551 BILATERAL HIP PAIN: Status: RESOLVED | Noted: 2019-10-11 | Resolved: 2024-08-28

## 2024-08-28 PROBLEM — M54.50 LOW BACK PAIN: Status: RESOLVED | Noted: 2018-07-06 | Resolved: 2024-08-28

## 2024-08-28 PROBLEM — M70.61 TROCHANTERIC BURSITIS OF BOTH HIPS: Chronic | Status: RESOLVED | Noted: 2023-12-12 | Resolved: 2024-08-28

## 2024-08-28 PROBLEM — M25.552 BILATERAL HIP PAIN: Status: RESOLVED | Noted: 2019-10-11 | Resolved: 2024-08-28

## 2024-08-28 PROBLEM — G51.0 BELL'S PALSY: Status: RESOLVED | Noted: 2021-02-22 | Resolved: 2024-08-28

## 2024-08-28 PROCEDURE — 99214 OFFICE O/P EST MOD 30 MIN: CPT | Performed by: FAMILY MEDICINE

## 2024-08-28 PROCEDURE — G2211 COMPLEX E/M VISIT ADD ON: HCPCS | Performed by: FAMILY MEDICINE

## 2024-09-17 ENCOUNTER — TELEPHONE (OUTPATIENT)
Age: 79
End: 2024-09-17

## 2024-09-17 NOTE — TELEPHONE ENCOUNTER
The patient called he spoke with his insurance company and he needs a prior authorization to have his current tub changed to a walk in tub and  a prior authorization to order a raised toilet seat

## 2024-09-19 ENCOUNTER — ESTABLISHED COMPREHENSIVE EXAM (OUTPATIENT)
Dept: URBAN - METROPOLITAN AREA CLINIC 6 | Facility: CLINIC | Age: 79
End: 2024-09-19

## 2024-09-19 DIAGNOSIS — H43.811: ICD-10-CM

## 2024-09-19 DIAGNOSIS — H04.123: ICD-10-CM

## 2024-09-19 DIAGNOSIS — H25.813: ICD-10-CM

## 2024-09-19 PROCEDURE — 92014 COMPRE OPH EXAM EST PT 1/>: CPT

## 2024-09-19 ASSESSMENT — VISUAL ACUITY
OU_CC: J1+
OD_CC: 20/20
OS_CC: 20/20

## 2024-09-19 ASSESSMENT — TONOMETRY
OD_IOP_MMHG: 12
OS_IOP_MMHG: 13

## 2024-09-23 LAB
DME PARACHUTE DELIVERY DATE REQUESTED: NORMAL
DME PARACHUTE ITEM DESCRIPTION: NORMAL
DME PARACHUTE ORDER STATUS: NORMAL
DME PARACHUTE SUPPLIER NAME: NORMAL
DME PARACHUTE SUPPLIER PHONE: NORMAL

## 2024-11-10 DIAGNOSIS — I10 PRIMARY HYPERTENSION: Chronic | ICD-10-CM

## 2024-11-12 RX ORDER — LOSARTAN POTASSIUM 50 MG/1
100 TABLET ORAL DAILY
Qty: 180 TABLET | Refills: 3 | Status: SHIPPED | OUTPATIENT
Start: 2024-11-12

## 2024-11-13 ENCOUNTER — RA CDI HCC (OUTPATIENT)
Dept: OTHER | Facility: HOSPITAL | Age: 79
End: 2024-11-13

## 2024-11-19 ENCOUNTER — OFFICE VISIT (OUTPATIENT)
Dept: FAMILY MEDICINE CLINIC | Facility: CLINIC | Age: 79
End: 2024-11-19
Payer: COMMERCIAL

## 2024-11-19 DIAGNOSIS — M16.11 PRIMARY OSTEOARTHRITIS OF RIGHT HIP: ICD-10-CM

## 2024-11-19 DIAGNOSIS — M47.816 FACET ARTHRITIS OF LUMBAR REGION: ICD-10-CM

## 2024-11-19 DIAGNOSIS — I47.19 ATRIAL TACHYCARDIA (HCC): ICD-10-CM

## 2024-11-19 DIAGNOSIS — E78.6 LOW HDL (UNDER 40): Chronic | ICD-10-CM

## 2024-11-19 DIAGNOSIS — K21.9 GASTROESOPHAGEAL REFLUX DISEASE WITHOUT ESOPHAGITIS: ICD-10-CM

## 2024-11-19 DIAGNOSIS — M51.9 LUMBAR DISC DISEASE: ICD-10-CM

## 2024-11-19 DIAGNOSIS — Z00.00 MEDICARE ANNUAL WELLNESS VISIT, SUBSEQUENT: ICD-10-CM

## 2024-11-19 DIAGNOSIS — M16.12 PRIMARY OSTEOARTHRITIS OF LEFT HIP: ICD-10-CM

## 2024-11-19 DIAGNOSIS — J31.0 CHRONIC RHINITIS: ICD-10-CM

## 2024-11-19 DIAGNOSIS — I10 PRIMARY HYPERTENSION: Primary | Chronic | ICD-10-CM

## 2024-11-19 PROCEDURE — 99214 OFFICE O/P EST MOD 30 MIN: CPT | Performed by: FAMILY MEDICINE

## 2024-11-19 PROCEDURE — G0439 PPPS, SUBSEQ VISIT: HCPCS | Performed by: FAMILY MEDICINE

## 2024-11-19 RX ORDER — FLUTICASONE PROPIONATE 50 MCG
1 SPRAY, SUSPENSION (ML) NASAL DAILY
COMMUNITY
End: 2024-11-19 | Stop reason: SDUPTHER

## 2024-11-19 RX ORDER — FLUTICASONE PROPIONATE 50 MCG
2 SPRAY, SUSPENSION (ML) NASAL DAILY
Qty: 48 G | Refills: 1 | Status: SHIPPED | OUTPATIENT
Start: 2024-11-19 | End: 2025-02-17

## 2024-11-19 RX ORDER — SODIUM FLUORIDE 6 MG/ML
PASTE, DENTIFRICE DENTAL
COMMUNITY
Start: 2024-11-12

## 2024-11-19 NOTE — ASSESSMENT & PLAN NOTE
3/2023 echocardiogram normal left ventricular systolic function.  EF 60 to 65%.  Left ventricle size normal.  Wall motion normal.  Right ventricle normal.  Mild to moderate tricuspid  regurgitation.     3/2023 Holter monitor normal sinus rhythm.  Infrequent PACs PACs occasional short runs of SVT.  No AF    He is followed by Cardiology yearly     Follow-up with cardiology.

## 2024-11-19 NOTE — ASSESSMENT & PLAN NOTE
07/2024 Lipid profile cholesterol 122.  Triglycerides 57.  HDL 40.  LDL 71.  LFTs normal except for total bilirubin 1.5

## 2024-11-19 NOTE — ASSESSMENT & PLAN NOTE
Recurrent bilateral hip pain. He is using PRN Ibuprofen for pain. Prior PT. prior steroid injections for trochanteric bursitis.    05/2020 x rays L hip  Mild/moderate osteoarthritis. Joint space alignment is maintained.     Continue with symptom treatment.  Consider intra-articular steroid injection.    Orders:    XR hip/pelv 2-3 vws left if performed; Future

## 2024-11-19 NOTE — PROGRESS NOTES
Name: Brad Street      : 1945      MRN: [id number]  Encounter Provider: Desmond Dinh MD  Encounter Date: 2024   Encounter department: [facility]    Assessment & Plan  Primary hypertension    Blood pressures have been stable on Losartan 100 mg daily.     2024 creatinine 1.14.  GFR 66.  Electrolytes normal except for bicarb 32.    2024 creatinine 1.15.  GFR 65.  Electrolytes normal except for bicarb 32.Hemoglobin 14.0    BP Readings from Last 3 Encounters:   24 134/82   24 138/86   24 150/86     Continue with current medication       Low HDL (under 40)    2024 Lipid profile cholesterol 122.  Triglycerides 57.  HDL 40.  LDL 71.  LFTs normal except for total bilirubin 1.5           Atrial tachycardia (HCC)    3/2023 echocardiogram normal left ventricular systolic function.  EF 60 to 65%.  Left ventricle size normal.  Wall motion normal.  Right ventricle normal.  Mild to moderate tricuspid  regurgitation.     3/2023 Holter monitor normal sinus rhythm.  Infrequent PACs PACs occasional short runs of SVT.  No AF    He is followed by Cardiology yearly     Follow-up with cardiology.       Gastroesophageal reflux disease without esophagitis    Stable on Omeprazole 40 mg daily. No dysphagia          Primary osteoarthritis of left hip    Recurrent bilateral hip pain. He is using PRN Ibuprofen for pain. Prior PT. prior steroid injections for trochanteric bursitis.    2020 x rays L hip  Mild/moderate osteoarthritis. Joint space alignment is maintained.     Continue with symptom treatment.  Consider intra-articular steroid injection.    Orders:    XR hip/pelv 2-3 vws left if performed; Future    Primary osteoarthritis of right hip    2020 x rays R hip  Mild/moderate osteoarthritis. Joint space alignment is maintained.     Continue with symptom treatment.  Consider intra-articular steroid injection.    Orders:    XR hip/pelv 2-3 vws right if performed;  Future    Lumbar disc disease    01/2019 MRI lumbar spine mild to moderate spondylosis of the lumbar spine.  Mild to moderate spinal canal narrowing present from L2-L5.  Mild multilevel neuroforaminal narrowing present.         Facet arthritis of lumbar region         Chronic rhinitis    Orders:    fluticasone (FLONASE) 50 mcg/act nasal spray; 2 sprays into each nostril daily    Medicare annual wellness visit, subsequent           Depression Screening and Follow-up Plan: Patient was screened for depression during today's encounter. They screened negative with a PHQ-2 score of 0.    Falls Plan of Care: balance, strength, and gait training instructions were provided.       Preventive health issues were discussed with patient, and age appropriate screening tests were ordered as noted in patient's After Visit Summary. Personalized health advice and appropriate referrals for health education or preventive services given if needed, as noted in patient's After Visit Summary.    OV 6 months     History of Present Illness       CC follow-up visit for chronic medical problems/AWV.  Medications reviewed.       Patient Care Team:  Desmond Dinh MD as PCP - General (Family Medicine)    Review of Systems   Constitutional:  Negative for appetite change, chills, fever and unexpected weight change.   HENT:  Negative for congestion, ear pain, rhinorrhea, sore throat and trouble swallowing.    Eyes:  Negative for visual disturbance.   Respiratory:  Negative for cough, shortness of breath and wheezing.    Cardiovascular:  Negative for chest pain, palpitations and leg swelling.   Gastrointestinal:  Negative for abdominal pain, blood in stool, constipation, diarrhea, nausea and vomiting.   Endocrine: Negative for polydipsia and polyuria.   Genitourinary:  Negative for difficulty urinating.   Musculoskeletal:  Positive for arthralgias and back pain. Negative for myalgias.   Skin:  Negative for rash.   Allergic/Immunologic: Negative for  environmental allergies.   Neurological:  Negative for dizziness and headaches.   Hematological:  Negative for adenopathy. Does not bruise/bleed easily.   Psychiatric/Behavioral:  Negative for dysphoric mood and sleep disturbance.      Medical History Reviewed by provider this encounter:       Annual Wellness Visit Questionnaire   Brad is here for his Subsequent Wellness visit. Last Medicare Wellness visit information reviewed, patient interviewed and updates made to the record today.      Health Risk Assessment:   Patient rates overall health as very good. Patient feels that their physical health rating is same. Patient is satisfied with their life. Eyesight was rated as same. Hearing was rated as same. Patient feels that their emotional and mental health rating is same. Patients states they are sometimes angry. Patient states they are sometimes unusually tired/fatigued. Pain experienced in the last 7 days has been some. Patient's pain rating has been 9/10. Patient states that he has experienced no weight loss or gain in last 6 months.     Depression Screening:   PHQ-2 Score: 0      Fall Risk Screening:   In the past year, patient has experienced: history of falling in past year    Number of falls: 1  Injured during fall?: Yes    Feels unsteady when standing or walking?: No    Worried about falling?: No      Home Safety:  Patient does not have trouble with stairs inside or outside of their home. Patient has working smoke alarms and has working carbon monoxide detector. Home safety hazards include: none.     Nutrition:   Current diet is Regular.     Medications:   Patient is not currently taking any over-the-counter supplements. Patient is able to manage medications.     Activities of Daily Living (ADLs)/Instrumental Activities of Daily Living (IADLs):   Walk and transfer into and out of bed and chair?: Yes  Dress and groom yourself?: Yes    Bathe or shower yourself?: Yes    Feed yourself? Yes  Do your  laundry/housekeeping?: Yes  Manage your money, pay your bills and track your expenses?: Yes  Make your own meals?: Yes    Do your own shopping?: Yes    Previous Hospitalizations:   Any hospitalizations or ED visits within the last 12 months?: No      Advance Care Planning:   Living will: Yes    Durable POA for healthcare: Yes    Advanced directive: Yes      Cognitive Screening:   Provider or family/friend/caregiver concerned regarding cognition?: No    PREVENTIVE SCREENINGS      Cardiovascular Screening:    General: Screening Current      Diabetes Screening:     General: Screening Current      Colorectal Cancer Screening:     General: Risks and Benefits Discussed      Prostate Cancer Screening:    General: Screening Not Indicated      Osteoporosis Screening:    General: Screening Not Indicated      Abdominal Aortic Aneurysm (AAA) Screening:        General: Screening Not Indicated      Lung Cancer Screening:     General: Screening Not Indicated      Hepatitis C Screening:    General: Screening Current    Screening, Brief Intervention, and Referral to Treatment (SBIRT)    Screening  Typical number of drinks in a day: 0  Typical number of drinks in a week: 0  Interpretation: Low risk drinking behavior.    Single Item Drug Screening:  How often have you used an illegal drug (including marijuana) or a prescription medication for non-medical reasons in the past year? never    Single Item Drug Screen Score: 0  Interpretation: Negative screen for possible drug use disorder    Brief Intervention  Alcohol & drug use screenings were reviewed. No concerns regarding substance use disorder identified.     Other Counseling Topics:   Calcium and vitamin D intake and regular weightbearing exercise.     Social Drivers of Health     Food Insecurity: No Food Insecurity (11/19/2024)    Hunger Vital Sign     Worried About Running Out of Food in the Last Year: Never true     Ran Out of Food in the Last Year: Never true   Transportation  "Needs: No Transportation Needs (11/19/2024)    PRAPARE - Transportation     Lack of Transportation (Medical): No     Lack of Transportation (Non-Medical): No   Housing Stability: Unknown (11/19/2024)    Housing Stability Vital Sign     Unable to Pay for Housing in the Last Year: No     Homeless in the Last Year: No   Utilities: Not At Risk (11/19/2024)    [facility] Utilities     Threatened with loss of utilities: No         Objective   /82 (BP Location: Left arm, Patient Position: Sitting, Cuff Size: Standard)   Pulse 56   Temp 97.9 °F (36.6 °C)   Resp 16   Ht 5' 10\" (1.778 m)   Wt 83.5 kg (184 lb)   SpO2 98%   BMI 26.40 kg/m²     Wt Readings from Last 3 Encounters:   11/19/24 83.5 kg (184 lb)   08/28/24 80.7 kg (178 lb)   08/19/24 80.7 kg (178 lb)         Physical Exam  Constitutional:       General: He is not in acute distress.  HENT:      Right Ear: Tympanic membrane and ear canal normal.      Left Ear: Tympanic membrane and ear canal normal.   Eyes:      General: No scleral icterus.     Extraocular Movements: Extraocular movements intact.      Conjunctiva/sclera: Conjunctivae normal.      Pupils: Pupils are equal, round, and reactive to light.   Neck:      Vascular: No carotid bruit.   Cardiovascular:      Rate and Rhythm: Normal rate and regular rhythm.      Heart sounds: No murmur heard.     No gallop.   Pulmonary:      Effort: Pulmonary effort is normal.      Breath sounds: Normal breath sounds. No wheezing or rales.   Musculoskeletal:      Right hip: No tenderness or bony tenderness. Decreased range of motion (with internal rotation). Normal strength.      Left hip: No tenderness or bony tenderness. Decreased range of motion (with internal rotation). Normal strength.      Right lower leg: No edema.      Left lower leg: No edema.   Lymphadenopathy:      Cervical: No cervical adenopathy.      Upper Body:      Right upper body: No supraclavicular adenopathy.      Left upper body: No supraclavicular " adenopathy.   Skin:     Findings: No rash.   Neurological:      General: No focal deficit present.      Mental Status: He is alert and oriented to person, place, and time.      Motor: No weakness.   Psychiatric:         Mood and Affect: Mood normal.         Cognition and Memory: Cognition normal.

## 2024-11-19 NOTE — ASSESSMENT & PLAN NOTE
Blood pressures have been stable on Losartan 100 mg daily.     9/2024 creatinine 1.14.  GFR 66.  Electrolytes normal except for bicarb 32.    07/2024 creatinine 1.15.  GFR 65.  Electrolytes normal except for bicarb 32.Hemoglobin 14.0    BP Readings from Last 3 Encounters:   11/19/24 134/82   08/28/24 138/86   06/19/24 150/86     Continue with current medication

## 2024-11-19 NOTE — ASSESSMENT & PLAN NOTE
01/2019 MRI lumbar spine mild to moderate spondylosis of the lumbar spine.  Mild to moderate spinal canal narrowing present from L2-L5.  Mild multilevel neuroforaminal narrowing present.

## 2024-11-20 VITALS
RESPIRATION RATE: 16 BRPM | BODY MASS INDEX: 26.34 KG/M2 | DIASTOLIC BLOOD PRESSURE: 82 MMHG | HEIGHT: 70 IN | TEMPERATURE: 97.9 F | OXYGEN SATURATION: 98 % | SYSTOLIC BLOOD PRESSURE: 134 MMHG | WEIGHT: 184 LBS | HEART RATE: 56 BPM

## 2024-11-27 ENCOUNTER — RESULTS FOLLOW-UP (OUTPATIENT)
Dept: FAMILY MEDICINE CLINIC | Facility: CLINIC | Age: 79
End: 2024-11-27

## 2024-11-27 NOTE — TELEPHONE ENCOUNTER
Call re x rays right moderate to severe osteoarthritis ( worse than left hip)   treatment options-PT, steroid injection into the hip joint. This is done with x ray guidance by Interventional Radiology  or he could be referred to orthopedic surgery

## 2024-11-27 NOTE — TELEPHONE ENCOUNTER
Patient called back to inform provider he would like to pursue the option of steroid injection into hip joint done by IR. Please follow up with the patient for this process.     Patient aware Flonase order sent to [facility] on 11/19. If not received today, he will fu with office on Friday 11/29.

## 2024-11-29 NOTE — TELEPHONE ENCOUNTER
Patient wanted to consult with the doctor to figure out if he should get both hip injected or just the one. He stated that the left hip feels worse but the right hip hurt as well.  Shauna Fiore

## 2024-12-05 NOTE — TELEPHONE ENCOUNTER
Patient calling to follow up on previous message.  He has not heard back from office in regards to the injections.    Please call patient back

## 2024-12-11 ENCOUNTER — OFFICE VISIT (OUTPATIENT)
Dept: FAMILY MEDICINE CLINIC | Facility: CLINIC | Age: 79
End: 2024-12-11
Payer: COMMERCIAL

## 2024-12-11 VITALS
OXYGEN SATURATION: 98 % | SYSTOLIC BLOOD PRESSURE: 132 MMHG | TEMPERATURE: 97.6 F | DIASTOLIC BLOOD PRESSURE: 78 MMHG | HEART RATE: 57 BPM | RESPIRATION RATE: 18 BRPM | BODY MASS INDEX: 26.92 KG/M2 | WEIGHT: 188 LBS | HEIGHT: 70 IN

## 2024-12-11 DIAGNOSIS — M70.62 TROCHANTERIC BURSITIS, LEFT HIP: ICD-10-CM

## 2024-12-11 DIAGNOSIS — M70.61 TROCHANTERIC BURSITIS, RIGHT HIP: Primary | ICD-10-CM

## 2024-12-11 PROCEDURE — 20610 DRAIN/INJ JOINT/BURSA W/O US: CPT | Performed by: FAMILY MEDICINE

## 2024-12-11 RX ORDER — METHYLPREDNISOLONE ACETATE 80 MG/ML
80 INJECTION, SUSPENSION INTRA-ARTICULAR; INTRALESIONAL; INTRAMUSCULAR; SOFT TISSUE ONCE
Status: COMPLETED | OUTPATIENT
Start: 2024-12-11 | End: 2024-12-11

## 2024-12-11 RX ADMIN — METHYLPREDNISOLONE ACETATE 80 MG: 80 INJECTION, SUSPENSION INTRA-ARTICULAR; INTRALESIONAL; INTRAMUSCULAR; SOFT TISSUE at 14:15

## 2024-12-11 NOTE — PROGRESS NOTES
Large joint arthrocentesis: R greater trochanteric bursa  Universal Protocol:  procedure performed by consultantConsent: Written consent obtained.  Risks and benefits: risks, benefits and alternatives were discussed  Consent given by: patient  Site marked: the operative site was marked  Supporting Documentation  Indications: pain (Right hip trochanteric bursitis)   Procedure Details  Location: hip - R greater trochanteric bursa  Preparation: Betadine.  Needle size: 22 G  Ultrasound guidance: no  Approach: lateral    Patient tolerance: patient tolerated the procedure well with no immediate complications  Dressing:  Sterile dressing applied    Depo-Medrol 80 mg/ML and Marcaine 0.75% 4 ML       Large joint arthrocentesis: L greater trochanteric bursa  Universal Protocol:  procedure performed by consultantConsent: Written consent obtained.  Consent given by: patient  Site marked: the operative site was marked  Supporting Documentation  Indications: pain (Left hip trochanteric bursitis)   Procedure Details  Location: hip - L greater trochanteric bursa  Preparation: Betadine.  Needle size: 22 G  Ultrasound guidance: no  Approach: lateral    Patient tolerance: patient tolerated the procedure well with no immediate complications  Dressing:  Sterile dressing applied    Depo-Medrol 80 mg/ML and Marcaine 0.75% 4 ML         Brad was seen today for injections.    Diagnoses and all orders for this visit:    Trochanteric bursitis, right hip  -     Large joint arthrocentesis: R greater trochanteric bursa  -     methylPREDNISolone acetate (DEPO-MEDROL) injection 80 mg    Trochanteric bursitis, left hip  -     Large joint arthrocentesis: L greater trochanteric bursa  -     methylPREDNISolone acetate (DEPO-MEDROL) injection 80 mg       Symptomatic bilateral trochanteric bursitis.    X-rays 11/2024 moderate to severe osteoarthritis of right hip.  Moderate osteoarthritis of left hip.    Prior intra-articular steroid injections at [facility] with minimal improvement.    He is using infrequent ibuprofen.  He exercises/as stretches on a regular basis.

## 2024-12-26 ENCOUNTER — TELEPHONE (OUTPATIENT)
Dept: FAMILY MEDICINE CLINIC | Facility: CLINIC | Age: 79
End: 2024-12-26

## 2024-12-26 NOTE — TELEPHONE ENCOUNTER
Patient called the RX Refill Line. Message is being forwarded to the office.       [facility] calling,  patient is allergic to prednisone, asking if ok to fill the script for the fluticasone. Please call [phone]  Ref #[id number]

## 2024-12-30 DIAGNOSIS — J31.0 CHRONIC RHINITIS: ICD-10-CM

## 2024-12-30 RX ORDER — FLUTICASONE PROPIONATE 50 MCG
2 SPRAY, SUSPENSION (ML) NASAL DAILY
Qty: 48 G | Refills: 1 | Status: SHIPPED | OUTPATIENT
Start: 2024-12-30 | End: 2025-03-30

## 2024-12-30 NOTE — TELEPHONE ENCOUNTER
Pt called back stating that he would like this rx to be sent to [facility]. Please advise.

## 2025-03-26 ENCOUNTER — TELEPHONE (OUTPATIENT)
Age: 80
End: 2025-03-26

## 2025-03-26 NOTE — TELEPHONE ENCOUNTER
The patient called to get some suggestion regarding a back pain that he had every time that he wake up. The patient state that he use pillows around his leg and a tower in his back to rest but the next morning he is in pain and he would like to know what suggestion the provider can give him.     Please Advice the patient is aware Dr. Dinh schedule we offer an appointment with Dr. BENZ but the patient would like to wait till hear something from Dr. Dinh.

## 2025-03-27 NOTE — TELEPHONE ENCOUNTER
Message was relayed to patient, patient tried scheduling but there was no available appt till July and he already has an appt for July. Patient would like to try physical therapy, please place referral.

## 2025-03-30 DIAGNOSIS — G89.29 CHRONIC BILATERAL LOW BACK PAIN WITHOUT SCIATICA: Primary | ICD-10-CM

## 2025-03-30 DIAGNOSIS — M54.50 CHRONIC BILATERAL LOW BACK PAIN WITHOUT SCIATICA: Primary | ICD-10-CM

## 2025-05-08 ENCOUNTER — PROBLEM (OUTPATIENT)
Dept: URBAN - METROPOLITAN AREA CLINIC 6 | Facility: CLINIC | Age: 80
End: 2025-05-08

## 2025-05-08 DIAGNOSIS — H04.123: ICD-10-CM

## 2025-05-08 DIAGNOSIS — H02.825: ICD-10-CM

## 2025-05-08 DIAGNOSIS — H10.45: ICD-10-CM

## 2025-05-08 PROCEDURE — 92012 INTRM OPH EXAM EST PATIENT: CPT

## 2025-05-08 ASSESSMENT — VISUAL ACUITY
OD_CC: 20/25
OS_CC: 20/25

## 2025-05-08 ASSESSMENT — TONOMETRY
OS_IOP_MMHG: 11
OD_IOP_MMHG: 15

## 2025-06-16 ENCOUNTER — TELEPHONE (OUTPATIENT)
Age: 80
End: 2025-06-16

## 2025-06-16 NOTE — TELEPHONE ENCOUNTER
As of now this is the soonest we have open. Dr Dinh is not in this week. If something opens before 6/30/25 we will call patient.

## 2025-06-16 NOTE — TELEPHONE ENCOUNTER
Pt called in stating he has an appt on 6/30 but would like to be seen sooner. There were no other appt available before then. Pt doesn't have MyChart but would like to be placed on an in office wait list if there is one.

## 2025-06-30 ENCOUNTER — OFFICE VISIT (OUTPATIENT)
Dept: FAMILY MEDICINE CLINIC | Facility: CLINIC | Age: 80
End: 2025-06-30
Payer: COMMERCIAL

## 2025-06-30 VITALS
HEART RATE: 60 BPM | TEMPERATURE: 97.6 F | HEIGHT: 70 IN | RESPIRATION RATE: 18 BRPM | WEIGHT: 189.5 LBS | DIASTOLIC BLOOD PRESSURE: 78 MMHG | SYSTOLIC BLOOD PRESSURE: 132 MMHG | BODY MASS INDEX: 27.13 KG/M2 | OXYGEN SATURATION: 98 %

## 2025-06-30 DIAGNOSIS — M70.61 TROCHANTERIC BURSITIS, RIGHT HIP: Primary | ICD-10-CM

## 2025-06-30 DIAGNOSIS — M70.62 TROCHANTERIC BURSITIS, LEFT HIP: ICD-10-CM

## 2025-06-30 DIAGNOSIS — M54.50 LOW BACK PAIN, UNSPECIFIED BACK PAIN LATERALITY, UNSPECIFIED CHRONICITY, UNSPECIFIED WHETHER SCIATICA PRESENT: ICD-10-CM

## 2025-06-30 PROCEDURE — 20610 DRAIN/INJ JOINT/BURSA W/O US: CPT

## 2025-06-30 RX ORDER — METHYLPREDNISOLONE ACETATE 80 MG/ML
80 INJECTION, SUSPENSION INTRA-ARTICULAR; INTRALESIONAL; INTRAMUSCULAR; SOFT TISSUE ONCE
Status: COMPLETED | OUTPATIENT
Start: 2025-06-30 | End: 2025-07-01

## 2025-06-30 NOTE — PROGRESS NOTES
Large joint arthrocentesis: L greater trochanteric bursa    Performed by: Desmond Dinh MD  Authorized by: Desmond Dinh MD    [place] Protocol:  procedure performed by consultantConsent: Verbal consent obtained  Risks and benefits: risks, benefits and alternatives were discussed  Consent given by: patient  Site marked: the operative site was marked  Supporting Documentation  Indications: pain (Left hip trochanteric bursitis)     Is this a Visco injection? NoProcedure Details  Location: hip - L greater trochanteric bursa  Preparation: Betadine.  Needle size: 22 G  Ultrasound guidance: no  Approach: lateral    Patient tolerance: patient tolerated the procedure well with no immediate complications  Dressing:  Sterile dressing applied    DepoMedrol 80 mg/ML and Lidocaine 1% 3 ML       Large joint arthrocentesis: R greater trochanteric bursa    Performed by: Desmond Dinh MD  Authorized by: Desmond Dinh MD    [place] Protocol:  procedure performed by consultantConsent: Verbal consent obtained  Risks and benefits: risks, benefits and alternatives were discussed  Consent given by: patient  Site marked: the operative site was marked  Supporting Documentation  Indications: pain     Is this a Visco injection? NoProcedure Details  Location: hip - R greater trochanteric bursa  Preparation: Betadine.  Needle size: 22 G  Ultrasound guidance: no  Approach: lateral    Patient tolerance: patient tolerated the procedure well with no immediate complications  Dressing:  Sterile dressing applied    DepoMedrol 80 mg/ML and Lidocaine 1% 3 ML       Brad was seen today for injections.    Diagnoses and all orders for this visit:    Trochanteric bursitis, right hip  -     Large joint arthrocentesis: R greater trochanteric bursa  -     methylPREDNISolone acetate (DEPO-MEDROL) injection 80 mg    Trochanteric bursitis, left hip  -     Large joint arthrocentesis: L greater trochanteric bursa  -     methylPREDNISolone acetate  (DEPO-MEDROL) injection 80 mg       Patient here for bilateral hip injections for trochanteric bursitis.  Significant relief with last injections 6 months ago.  He is using ibuprofen 600 mg as needed he does a daily stretching program.  X-rays of both hips 11/2024 reviewed    If trochanteric bursa injections are ineffective consider IR referral for intra-articular steroid injections

## 2025-07-01 RX ADMIN — METHYLPREDNISOLONE ACETATE 80 MG: 80 INJECTION, SUSPENSION INTRA-ARTICULAR; INTRALESIONAL; INTRAMUSCULAR; SOFT TISSUE at 08:08

## 2025-07-02 RX ORDER — IBUPROFEN 600 MG/1
600 TABLET ORAL 3 TIMES DAILY
Qty: 270 TABLET | Refills: 3 | Status: SHIPPED | OUTPATIENT
Start: 2025-07-02

## 2025-07-22 ENCOUNTER — OFFICE VISIT (OUTPATIENT)
Dept: FAMILY MEDICINE CLINIC | Facility: CLINIC | Age: 80
End: 2025-07-22
Payer: COMMERCIAL

## 2025-07-22 VITALS
BODY MASS INDEX: 26.92 KG/M2 | RESPIRATION RATE: 18 BRPM | OXYGEN SATURATION: 98 % | HEIGHT: 70 IN | TEMPERATURE: 97.8 F | DIASTOLIC BLOOD PRESSURE: 64 MMHG | WEIGHT: 188 LBS | SYSTOLIC BLOOD PRESSURE: 124 MMHG | HEART RATE: 57 BPM

## 2025-07-22 DIAGNOSIS — I10 PRIMARY HYPERTENSION: Primary | Chronic | ICD-10-CM

## 2025-07-22 DIAGNOSIS — E78.6 LOW HDL (UNDER 40): Chronic | ICD-10-CM

## 2025-07-22 DIAGNOSIS — M16.0 PRIMARY OSTEOARTHRITIS OF BOTH HIPS: ICD-10-CM

## 2025-07-22 DIAGNOSIS — K21.9 GASTROESOPHAGEAL REFLUX DISEASE WITHOUT ESOPHAGITIS: ICD-10-CM

## 2025-07-22 PROCEDURE — 99214 OFFICE O/P EST MOD 30 MIN: CPT | Performed by: FAMILY MEDICINE

## 2025-07-22 PROCEDURE — G2211 COMPLEX E/M VISIT ADD ON: HCPCS | Performed by: FAMILY MEDICINE

## 2025-07-22 RX ORDER — OMEPRAZOLE 20 MG/1
20 CAPSULE, DELAYED RELEASE ORAL DAILY
Qty: 90 CAPSULE | Refills: 3 | Status: SHIPPED | OUTPATIENT
Start: 2025-07-22

## 2025-07-22 NOTE — ASSESSMENT & PLAN NOTE
Blood pressures have been stable on Losartan 100 mg daily.     07/2025 creatinine 1.32. GFR 55  He uses daily Ibuprofen. On PPI Omeprazole 40 mg/day     9/2024 creatinine 1.14.  GFR 66.  Electrolytes normal except for bicarb 32.    07/2024 creatinine 1.15.  GFR 65.  Electrolytes normal except for bicarb 32.Hemoglobin 14.0    BP Readings from Last 3 Encounters:   07/22/25 124/64   06/30/25 132/78   12/11/24 132/78     Stay hydrated  Limit NSAIDs  Repeat labs in 4 weeks       Orders:    CBC and differential    Comprehensive metabolic panel

## 2025-07-22 NOTE — ASSESSMENT & PLAN NOTE
Stable on Omeprazole 40 mg daily. No dysphagia     Decrease Omeprazole to 20 mg/day given recent elevated creatinine at 1.32.     Orders:    omeprazole (PriLOSEC) 20 mg delayed release capsule; Take 1 capsule (20 mg total) by mouth daily

## 2025-07-22 NOTE — PROGRESS NOTES
Name: Brad Street      : 1945      MRN: [id number]  Encounter Provider: Desmond Dinh MD  Encounter Date: 2025   Encounter department: [facility]  :  Assessment & Plan  Primary hypertension    Blood pressures have been stable on Losartan 100 mg daily.     2025 creatinine 1.32. GFR 55  He uses daily Ibuprofen. On PPI Omeprazole 40 mg/day     2024 creatinine 1.14.  GFR 66.  Electrolytes normal except for bicarb 32.    2024 creatinine 1.15.  GFR 65.  Electrolytes normal except for bicarb 32.Hemoglobin 14.0    BP Readings from Last 3 Encounters:   25 124/64   25 132/78   24 132/78     Stay hydrated  Limit NSAIDs  Repeat labs in 4 weeks       Orders:    CBC and differential    Comprehensive metabolic panel      Low HDL (under 40)    2024 Lipid profile cholesterol 122.  Triglycerides 57.  HDL 40.  LDL 71.  LFTs normal except for total bilirubin 1.5             Primary osteoarthritis of both hips    Recurrent bilateral hip pain. He is using PRN Ibuprofen for pain. He stretches daily. Prior PT. S/p steroid injections for trochanteric bursitis 2025 with relief     2020 x rays L hip  Mild/moderate osteoarthritis. Joint space alignment is maintained.     Continue with symptom treatment.  Consider intra-articular steroid injection.           Gastroesophageal reflux disease without esophagitis    Stable on Omeprazole 40 mg daily. No dysphagia     Decrease Omeprazole to 20 mg/day given recent elevated creatinine at 1.32.     Orders:    omeprazole (PriLOSEC) 20 mg delayed release capsule; Take 1 capsule (20 mg total) by mouth daily             History of Present Illness     CC follow up OV for chronic medical problems       Review of Systems   Constitutional:  Negative for appetite change, chills, fever and unexpected weight change.   HENT:  Negative for congestion, ear pain, rhinorrhea, sore throat and trouble swallowing.    Eyes:  Negative for visual  "disturbance.   Respiratory:  Negative for cough, shortness of breath and wheezing.    Cardiovascular:  Negative for chest pain, palpitations and leg swelling.   Gastrointestinal:  Negative for abdominal pain, blood in stool, constipation, diarrhea, nausea and vomiting.   Endocrine: Negative for polydipsia and polyuria.   Genitourinary:  Negative for difficulty urinating.   Musculoskeletal:  Positive for back pain. Negative for arthralgias and myalgias.   Skin:  Negative for rash.   Allergic/Immunologic: Negative for environmental allergies.   Neurological:  Negative for dizziness and headaches.   Hematological:  Negative for adenopathy. Does not bruise/bleed easily.   Psychiatric/Behavioral:  Negative for dysphoric mood and sleep disturbance.        Objective   /64 (BP Location: Left arm, Patient Position: Sitting, Cuff Size: Standard)   Pulse 57   Temp 97.8 °F (36.6 °C) (Temporal)   Resp 18   Ht 5' 10\" (1.778 m)   Wt 85.3 kg (188 lb)   SpO2 98%   BMI 26.98 kg/m²      Wt Readings from Last 3 Encounters:   07/22/25 85.3 kg (188 lb)   06/30/25 86 kg (189 lb 8 oz)   12/11/24 85.3 kg (188 lb)        Physical Exam  Constitutional:       General: He is not in acute distress.    Eyes:      General: No scleral icterus.     Conjunctiva/sclera: Conjunctivae normal.     Neck:      Thyroid: No thyroid mass or thyromegaly.      Vascular: Normal carotid pulses. No carotid bruit.     Cardiovascular:      Rate and Rhythm: Normal rate and regular rhythm.      Heart sounds: No murmur heard.  Pulmonary:      Effort: Pulmonary effort is normal.      Breath sounds: Normal breath sounds. No wheezing or rales.     Musculoskeletal:      Right lower leg: No edema.      Left lower leg: No edema.   Lymphadenopathy:      Cervical: No cervical adenopathy.      Upper Body:      Right upper body: No supraclavicular adenopathy.      Left upper body: No supraclavicular adenopathy.     Skin:     Findings: No rash.     Neurological:      " Mental Status: He is alert and oriented to person, place, and time.     Psychiatric:         Mood and Affect: Mood normal.         Behavior: Behavior normal.         Cognition and Memory: Cognition normal.

## 2025-07-22 NOTE — ASSESSMENT & PLAN NOTE
Recurrent bilateral hip pain. He is using PRN Ibuprofen for pain. He stretches daily. Prior PT. S/p steroid injections for trochanteric bursitis 06/2025 with relief     05/2020 x rays L hip  Mild/moderate osteoarthritis. Joint space alignment is maintained.     Continue with symptom treatment.  Consider intra-articular steroid injection.

## 2025-08-06 DIAGNOSIS — I10 PRIMARY HYPERTENSION: Chronic | ICD-10-CM

## 2025-08-07 RX ORDER — LOSARTAN POTASSIUM 50 MG/1
100 TABLET ORAL DAILY
Qty: 180 TABLET | Refills: 1 | Status: SHIPPED | OUTPATIENT
Start: 2025-08-07

## 2025-08-16 LAB
ALBUMIN SERPL-MCNC: 4.2 G/DL (ref 3.5–5.7)
ALP SERPL-CCNC: 83 U/L (ref 35–120)
ALT SERPL-CCNC: 9 U/L
ANION GAP SERPL CALCULATED.3IONS-SCNC: 8 MMOL/L (ref 3–11)
AST SERPL-CCNC: 19 U/L
BASOPHILS # BLD AUTO: 0 THOU/CMM (ref 0–0.1)
BASOPHILS NFR BLD AUTO: 1 %
BILIRUB SERPL-MCNC: 1.3 MG/DL (ref 0.2–1)
BUN SERPL-MCNC: 13 MG/DL (ref 7–28)
CALCIUM SERPL-MCNC: 9.2 MG/DL (ref 8.5–10.5)
CHLORIDE SERPL-SCNC: 105 MMOL/L (ref 100–109)
CO2 SERPL-SCNC: 30 MMOL/L (ref 21–31)
CREAT SERPL-MCNC: 1.27 MG/DL (ref 0.53–1.3)
CYTOLOGY CMNT CVX/VAG CYTO-IMP: ABNORMAL
DIFFERENTIAL METHOD BLD: ABNORMAL
EOSINOPHIL # BLD AUTO: 0.1 THOU/CMM (ref 0–0.5)
EOSINOPHIL NFR BLD AUTO: 2 %
ERYTHROCYTE [DISTWIDTH] IN BLOOD BY AUTOMATED COUNT: 13.6 % (ref 12–16)
GFR/BSA.PRED SERPLBLD CYS-BASED-ARV: 57 ML/MIN/{1.73_M2}
GLUCOSE SERPL-MCNC: 96 MG/DL (ref 65–99)
HCT VFR BLD AUTO: 38.7 % (ref 37–48)
HGB BLD-MCNC: 13.4 G/DL (ref 12.5–17)
LYMPHOCYTES # BLD AUTO: 1.1 THOU/CMM (ref 1–3)
LYMPHOCYTES NFR BLD AUTO: 24 %
MCH RBC QN AUTO: 33.8 PG (ref 27–36)
MCHC RBC AUTO-ENTMCNC: 34.6 G/DL (ref 32–37)
MCV RBC AUTO: 98 FL (ref 80–100)
MONOCYTES # BLD AUTO: 0.4 THOU/CMM (ref 0.3–1)
MONOCYTES NFR BLD AUTO: 8 %
NEUTROPHILS # BLD AUTO: 3.1 THOU/CMM (ref 1.8–7.8)
NEUTROPHILS NFR BLD AUTO: 65 %
PLATELET # BLD AUTO: 157 THOU/CMM (ref 140–350)
PMV BLD REES-ECKER: 8.2 FL (ref 7.5–11.3)
POTASSIUM SERPL-SCNC: 4 MMOL/L (ref 3.5–5.2)
PROT SERPL-MCNC: 6.8 G/DL (ref 6.3–8.3)
RBC # BLD AUTO: 3.96 MILL/CMM (ref 4–5.4)
SODIUM SERPL-SCNC: 143 MMOL/L (ref 135–145)
WBC # BLD AUTO: 4.8 THOU/CMM (ref 4–10.5)

## 2025-08-20 DIAGNOSIS — M54.50 CHRONIC BILATERAL LOW BACK PAIN WITHOUT SCIATICA: Primary | ICD-10-CM

## 2025-08-20 DIAGNOSIS — G89.29 CHRONIC BILATERAL LOW BACK PAIN WITHOUT SCIATICA: Primary | ICD-10-CM

## (undated) DEVICE — Device

## (undated) DEVICE — NDL CNTR 20CT FM MAG: Brand: MEDLINE INDUSTRIES, INC.

## (undated) DEVICE — SUT ETHILON 6-0 PS-3 18 IN 1665G

## (undated) DEVICE — TIBURON SPLIT SHEET: Brand: CONVERTORS

## (undated) DEVICE — NEEDLE 30 G X 1/2

## (undated) DEVICE — STERILE POLYISOPRENE POWDER-FREE SURGICAL GLOVES: Brand: PROTEXIS

## (undated) DEVICE — SKIN MARKER DUAL TIP WITH RULER CAP, FLEXIBLE RULER AND LABELS: Brand: DEVON

## (undated) DEVICE — NEEDLE BLUNT 18 G X 1 1/2IN

## (undated) DEVICE — BASIC PACK: Brand: CONVERTORS

## (undated) DEVICE — SUT ETHILON 5-0 PS-2 18 IN 1666G

## (undated) DEVICE — GLOVE SURG DERMASSURE LF 6.5

## (undated) DEVICE — SYRINGE 30ML LL

## (undated) DEVICE — SYRINGE 10ML LL CONTROL TOP

## (undated) DEVICE — X-RAY DETECTABLE SPONGES,16 PLY: Brand: VISTEC

## (undated) DEVICE — CABLE BIPOLAR DISP MEGADYNE

## (undated) DEVICE — INTENDED FOR TISSUE SEPARATION, AND OTHER PROCEDURES THAT REQUIRE A SHARP SURGICAL BLADE TO PUNCTURE OR CUT.: Brand: BARD-PARKER ® SAFETYLOCK CARBON RIB-BACK BLADES

## (undated) DEVICE — PLASTIC ADHESIVE BANDAGE: Brand: CURITY

## (undated) DEVICE — SUT VICRYL 5-0 PS-2 18 IN J495G